# Patient Record
Sex: FEMALE | Race: BLACK OR AFRICAN AMERICAN | NOT HISPANIC OR LATINO | URBAN - METROPOLITAN AREA
[De-identification: names, ages, dates, MRNs, and addresses within clinical notes are randomized per-mention and may not be internally consistent; named-entity substitution may affect disease eponyms.]

---

## 2021-12-30 ENCOUNTER — EMERGENCY (EMERGENCY)
Facility: HOSPITAL | Age: 81
LOS: 1 days | Discharge: HOME | End: 2021-12-30
Attending: STUDENT IN AN ORGANIZED HEALTH CARE EDUCATION/TRAINING PROGRAM | Admitting: STUDENT IN AN ORGANIZED HEALTH CARE EDUCATION/TRAINING PROGRAM
Payer: MEDICARE

## 2021-12-30 VITALS
DIASTOLIC BLOOD PRESSURE: 72 MMHG | RESPIRATION RATE: 100 BRPM | HEART RATE: 84 BPM | TEMPERATURE: 99 F | OXYGEN SATURATION: 100 % | SYSTOLIC BLOOD PRESSURE: 149 MMHG

## 2021-12-30 VITALS
RESPIRATION RATE: 16 BRPM | DIASTOLIC BLOOD PRESSURE: 78 MMHG | SYSTOLIC BLOOD PRESSURE: 140 MMHG | TEMPERATURE: 98 F | HEART RATE: 80 BPM | OXYGEN SATURATION: 99 %

## 2021-12-30 LAB
ALBUMIN SERPL ELPH-MCNC: 4.4 G/DL — SIGNIFICANT CHANGE UP (ref 3.3–5)
ALP SERPL-CCNC: 94 U/L — SIGNIFICANT CHANGE UP (ref 40–120)
ALT FLD-CCNC: 12 U/L — SIGNIFICANT CHANGE UP (ref 4–33)
ANION GAP SERPL CALC-SCNC: 12 MMOL/L — SIGNIFICANT CHANGE UP (ref 7–14)
APPEARANCE UR: ABNORMAL
AST SERPL-CCNC: 22 U/L — SIGNIFICANT CHANGE UP (ref 4–32)
BACTERIA # UR AUTO: ABNORMAL
BASOPHILS # BLD AUTO: 0.07 K/UL — SIGNIFICANT CHANGE UP (ref 0–0.2)
BASOPHILS NFR BLD AUTO: 1.2 % — SIGNIFICANT CHANGE UP (ref 0–2)
BILIRUB SERPL-MCNC: 0.5 MG/DL — SIGNIFICANT CHANGE UP (ref 0.2–1.2)
BILIRUB UR-MCNC: NEGATIVE — SIGNIFICANT CHANGE UP
BUN SERPL-MCNC: 12 MG/DL — SIGNIFICANT CHANGE UP (ref 7–23)
CALCIUM SERPL-MCNC: 9.3 MG/DL — SIGNIFICANT CHANGE UP (ref 8.4–10.5)
CHLORIDE SERPL-SCNC: 105 MMOL/L — SIGNIFICANT CHANGE UP (ref 98–107)
CO2 SERPL-SCNC: 26 MMOL/L — SIGNIFICANT CHANGE UP (ref 22–31)
COLOR SPEC: YELLOW — SIGNIFICANT CHANGE UP
CREAT SERPL-MCNC: 0.71 MG/DL — SIGNIFICANT CHANGE UP (ref 0.5–1.3)
DIFF PNL FLD: NEGATIVE — SIGNIFICANT CHANGE UP
EOSINOPHIL # BLD AUTO: 0.11 K/UL — SIGNIFICANT CHANGE UP (ref 0–0.5)
EOSINOPHIL NFR BLD AUTO: 1.9 % — SIGNIFICANT CHANGE UP (ref 0–6)
EPI CELLS # UR: SIGNIFICANT CHANGE UP /HPF (ref 0–5)
GLUCOSE SERPL-MCNC: 98 MG/DL — SIGNIFICANT CHANGE UP (ref 70–99)
GLUCOSE UR QL: NEGATIVE — SIGNIFICANT CHANGE UP
HCT VFR BLD CALC: 49.4 % — HIGH (ref 34.5–45)
HGB BLD-MCNC: 16.9 G/DL — HIGH (ref 11.5–15.5)
IANC: 2.47 K/UL — SIGNIFICANT CHANGE UP (ref 1.5–8.5)
IMM GRANULOCYTES NFR BLD AUTO: 0.2 % — SIGNIFICANT CHANGE UP (ref 0–1.5)
KETONES UR-MCNC: NEGATIVE — SIGNIFICANT CHANGE UP
LEUKOCYTE ESTERASE UR-ACNC: NEGATIVE — SIGNIFICANT CHANGE UP
LYMPHOCYTES # BLD AUTO: 2.62 K/UL — SIGNIFICANT CHANGE UP (ref 1–3.3)
LYMPHOCYTES # BLD AUTO: 44.1 % — HIGH (ref 13–44)
MCHC RBC-ENTMCNC: 31 PG — SIGNIFICANT CHANGE UP (ref 27–34)
MCHC RBC-ENTMCNC: 34.2 GM/DL — SIGNIFICANT CHANGE UP (ref 32–36)
MCV RBC AUTO: 90.6 FL — SIGNIFICANT CHANGE UP (ref 80–100)
MONOCYTES # BLD AUTO: 0.66 K/UL — SIGNIFICANT CHANGE UP (ref 0–0.9)
MONOCYTES NFR BLD AUTO: 11.1 % — SIGNIFICANT CHANGE UP (ref 2–14)
NEUTROPHILS # BLD AUTO: 2.47 K/UL — SIGNIFICANT CHANGE UP (ref 1.8–7.4)
NEUTROPHILS NFR BLD AUTO: 41.5 % — LOW (ref 43–77)
NITRITE UR-MCNC: NEGATIVE — SIGNIFICANT CHANGE UP
NRBC # BLD: 0 /100 WBCS — SIGNIFICANT CHANGE UP
NRBC # FLD: 0 K/UL — SIGNIFICANT CHANGE UP
PH UR: 7.5 — SIGNIFICANT CHANGE UP (ref 5–8)
PLATELET # BLD AUTO: 272 K/UL — SIGNIFICANT CHANGE UP (ref 150–400)
POTASSIUM SERPL-MCNC: 4.1 MMOL/L — SIGNIFICANT CHANGE UP (ref 3.5–5.3)
POTASSIUM SERPL-SCNC: 4.1 MMOL/L — SIGNIFICANT CHANGE UP (ref 3.5–5.3)
PROT SERPL-MCNC: 7.9 G/DL — SIGNIFICANT CHANGE UP (ref 6–8.3)
PROT UR-MCNC: ABNORMAL
RBC # BLD: 5.45 M/UL — HIGH (ref 3.8–5.2)
RBC # FLD: 14.2 % — SIGNIFICANT CHANGE UP (ref 10.3–14.5)
RBC CASTS # UR COMP ASSIST: SIGNIFICANT CHANGE UP /HPF (ref 0–4)
SARS-COV-2 RNA SPEC QL NAA+PROBE: SIGNIFICANT CHANGE UP
SODIUM SERPL-SCNC: 143 MMOL/L — SIGNIFICANT CHANGE UP (ref 135–145)
SP GR SPEC: 1.02 — SIGNIFICANT CHANGE UP (ref 1–1.05)
UROBILINOGEN FLD QL: SIGNIFICANT CHANGE UP
WBC # BLD: 5.94 K/UL — SIGNIFICANT CHANGE UP (ref 3.8–10.5)
WBC # FLD AUTO: 5.94 K/UL — SIGNIFICANT CHANGE UP (ref 3.8–10.5)
WBC UR QL: SIGNIFICANT CHANGE UP /HPF (ref 0–5)

## 2021-12-30 PROCEDURE — 71045 X-RAY EXAM CHEST 1 VIEW: CPT | Mod: 26

## 2021-12-30 PROCEDURE — 99284 EMERGENCY DEPT VISIT MOD MDM: CPT

## 2021-12-30 RX ORDER — ACETAMINOPHEN 500 MG
650 TABLET ORAL ONCE
Refills: 0 | Status: COMPLETED | OUTPATIENT
Start: 2021-12-30 | End: 2021-12-30

## 2021-12-30 RX ADMIN — Medication 650 MILLIGRAM(S): at 23:18

## 2021-12-30 NOTE — ED ADULT TRIAGE NOTE - CHIEF COMPLAINT QUOTE
Pt reporting to the ED for R ear pain for ~2 weeks. Pt reports fever for 2 days, no cough, sob or chest pain. Pt ambulatory with walker. rspo2 100 % on room air.

## 2021-12-30 NOTE — ED PROVIDER NOTE - PATIENT PORTAL LINK FT
You can access the FollowMyHealth Patient Portal offered by [facility] by registering at the following website: [email or web address]. By joining [employer]’s FollowMyHealth portal, you will also be able to view your health information using other applications (apps) compatible with our system.

## 2021-12-30 NOTE — ED PROVIDER NOTE - PROGRESS NOTE DETAILS
Stefan PGY-2: Attempted to remove impacted cerumen with partial success, see procedure note. Will check basics/CXR/UA in setting of recent fevers at home. Do not anticipate need for further work up if basics/UA/CXR negative, very likely viral syndrome, can be discharged with PMD f/u prior to RVP resulting with return precautions.

## 2021-12-30 NOTE — ED PROVIDER NOTE - ATTENDING CONTRIBUTION TO CARE
I (Robert) agree with above, I performed a history and physical. Counseled siddharth medical staff, physician assistant, and/or medical student on medical decision making as documented. Medical decisions and treatment interventions were made in real time during the patient encounter. Additionally and/or with the following exceptions: The patient presented to the ED with right ear discomfort, has been going on for 2-3 weeks, went to her pmd who started debrox drops. Of note reported to have a fever at home over past several days, ++vaccinated, no other infectious symptoms. will attempt cerumen removal, no evidence of otitis externa, no hearing loss with tuning fork. Given history of fever will obtain cxr, ua, basic labs to rule out infectious etiology, as well as flu/covid to rule out viral etiology. Signed out to oncoming attending pending this workup.

## 2021-12-30 NOTE — ED PROVIDER NOTE - OBJECTIVE STATEMENT
81 year old female with history of CAD s/p CABG, HTN presenting with R ear pain x 1 mo. States having throbbing pain to R ear in past ~month associated with decreased hearing, saw PMD and told to use peroxide drops but not improved. Also reports having fever in past 2d, denies chest pain, cough, abdominal pain, N/V/D, sick contacts. +COVID vaccinated. Denies etoh/tobacco/drug use. 81 year old female with history of CAD s/p CABG, HTN presenting with R ear pain x 1 mo. States having throbbing pain to R ear in past ~month associated with decreased hearing, saw PMD and told to use peroxide drops but not improved. Also reports having fever in past 2d, denies chest pain, cough, abdominal pain, N/V/D, sick contacts. +COVID vaccinated. Denies etoh/tobacco/drug use. ENT appointment made for february.

## 2021-12-30 NOTE — ED PROVIDER NOTE - NSFOLLOWUPINSTRUCTIONS_ED_ALL_ED_FT
You were seen in the Emergency Department for: ear wax collection in right ear    For pain/fever, you may take Tylenol (acetaminophen) 650 mg every 6 hours, OR Advil (ibuprofen) 600 mg every 8 hours.    Please follow up with your primary physician as discussed. If you do not have a primary physician or specialist of your needs, please call [phone] to find one convenient for you. At this number you will be able to locate a provider who accepts your insurance, as well as locate the right specialist for your needs.    You should return to the Emergency Department if you feel any new/worsening/persistent symptoms including but not limited to: chest pain, difficulty breathing, loss of consciousness, bleeding, uncontrolled pain, numbness/weakness of a body part

## 2021-12-30 NOTE — ED PROVIDER NOTE - PHYSICAL EXAMINATION
Gen - NAD; well-appearing; A+Ox3   HEENT - NCAT, EOMI, moist mucous membranes, no erythema/swelling/drainage to b/l external ear/auditory canal, no mastoid tenderness, +cerumen impaction R ear, unable to visualize TM  Neck - supple  Resp - CTAB, no increased WOB  CV -  RRR, no m/r/g  Abd - soft, NT, ND; no guarding or rebound  MSK - 5/5 strength and FROM b/l UE and LE  Extrem - no LE edema/erythema/tenderness  Neuro - no focal motor or sensation deficits

## 2021-12-30 NOTE — ED ADULT NURSE NOTE - OBJECTIVE STATEMENT
81 yof A&Ox4, c/o R ear pain x1 month that worsened today which is why she came into ED. No redness, drainage or swelling present. Respirations even and unlabored, no accessory muscle use. Pt denies any chest pain, dyspnea, dizziness, blurry vision, nausea, vomiting or discomfort., Pt well appearing, no acute distress noted. Pt ambulatory with cane. Labs drawn and sent. bed in lowest position, side rails up, call bell in hand, safety maintained. awaiting further orders. will continue to monitor.

## 2021-12-30 NOTE — ED PROVIDER NOTE - CLINICAL SUMMARY MEDICAL DECISION MAKING FREE TEXT BOX
81 year old female with history of CAD s/p CABG, HTN presenting with R ear pain x 1 mo. 81 year old female with history of CAD s/p CABG, HTN presenting with R ear pain x 1 mo. Appears well here with normal vitals, on exam has cerumen impaction to R ear without evidence for otitis externa/mastoiditis, unable to visualize R TM. Will attempt cerumen impaction but also obtain basic labs/UA/CXR/RVP to investigate etiology of subjective fever at home, likely viral

## 2021-12-31 LAB

## 2022-01-02 LAB
CULTURE RESULTS: SIGNIFICANT CHANGE UP
SPECIMEN SOURCE: SIGNIFICANT CHANGE UP

## 2022-01-06 PROBLEM — I25.10 ATHEROSCLEROTIC HEART DISEASE OF NATIVE CORONARY ARTERY WITHOUT ANGINA PECTORIS: Chronic | Status: ACTIVE | Noted: 2021-12-30

## 2022-01-06 PROBLEM — I10 ESSENTIAL (PRIMARY) HYPERTENSION: Chronic | Status: ACTIVE | Noted: 2021-12-30

## 2022-01-07 ENCOUNTER — APPOINTMENT (OUTPATIENT)
Dept: RHEUMATOLOGY | Facility: CLINIC | Age: 82
End: 2022-01-07
Payer: MEDICARE

## 2022-01-07 DIAGNOSIS — Z78.9 OTHER SPECIFIED HEALTH STATUS: ICD-10-CM

## 2022-01-07 DIAGNOSIS — M25.60 STIFFNESS OF UNSPECIFIED JOINT, NOT ELSEWHERE CLASSIFIED: ICD-10-CM

## 2022-01-07 DIAGNOSIS — Z87.891 PERSONAL HISTORY OF NICOTINE DEPENDENCE: ICD-10-CM

## 2022-01-07 DIAGNOSIS — Z82.61 FAMILY HISTORY OF ARTHRITIS: ICD-10-CM

## 2022-01-07 DIAGNOSIS — R52 PAIN, UNSPECIFIED: ICD-10-CM

## 2022-01-07 DIAGNOSIS — Z86.79 PERSONAL HISTORY OF OTHER DISEASES OF THE CIRCULATORY SYSTEM: ICD-10-CM

## 2022-01-07 DIAGNOSIS — M89.49 OTHER HYPERTROPHIC OSTEOARTHROPATHY, MULTIPLE SITES: ICD-10-CM

## 2022-01-07 DIAGNOSIS — Z86.39 PERSONAL HISTORY OF OTHER ENDOCRINE, NUTRITIONAL AND METABOLIC DISEASE: ICD-10-CM

## 2022-01-07 DIAGNOSIS — M13.0 POLYARTHRITIS, UNSPECIFIED: ICD-10-CM

## 2022-01-07 PROBLEM — Z00.00 ENCOUNTER FOR PREVENTIVE HEALTH EXAMINATION: Status: ACTIVE | Noted: 2022-01-07

## 2022-01-07 PROCEDURE — 99205 OFFICE O/P NEW HI 60 MIN: CPT | Mod: 95

## 2022-01-07 RX ORDER — NIFEDIPINE 60 MG/1
60 TABLET, EXTENDED RELEASE ORAL DAILY
Refills: 0 | Status: ACTIVE | COMMUNITY
Start: 2022-01-07

## 2022-01-07 RX ORDER — ASPIRIN ENTERIC COATED TABLETS 81 MG 81 MG/1
81 TABLET, DELAYED RELEASE ORAL
Refills: 0 | Status: ACTIVE | COMMUNITY
Start: 2022-01-07

## 2022-01-07 RX ORDER — OXYCODONE AND ACETAMINOPHEN 5; 325 MG/1; MG/1
5-325 TABLET ORAL EVERY 8 HOURS
Qty: 90 | Refills: 0 | Status: ACTIVE | COMMUNITY
Start: 2022-01-07 | End: 1900-01-01

## 2022-01-07 RX ORDER — ATORVASTATIN CALCIUM 40 MG/1
40 TABLET, FILM COATED ORAL DAILY
Qty: 90 | Refills: 1 | Status: ACTIVE | COMMUNITY
Start: 2022-01-07

## 2022-01-07 RX ORDER — GLUCOSAMINE/D3/BOSWELLIA SERRA 1500MG-400
TABLET ORAL
Refills: 0 | Status: ACTIVE | COMMUNITY
Start: 2022-01-07

## 2022-01-07 RX ORDER — GABAPENTIN 100 MG/1
100 CAPSULE ORAL
Refills: 0 | Status: ACTIVE | COMMUNITY
Start: 2022-01-07

## 2022-01-07 NOTE — PHYSICAL EXAM
[General Appearance - Alert] : alert [General Appearance - In No Acute Distress] : in no acute distress [Sclera] : the sclera and conjunctiva were normal [Outer Ear] : the ears and nose were normal in appearance [Oropharynx] : the oropharynx was normal [Neck Appearance] : the appearance of the neck was normal [Skin Color & Pigmentation] : normal skin color and pigmentation [Skin Turgor] : normal skin turgor [] : no rash [Oriented To Time, Place, And Person] : oriented to person, place, and time [Impaired Insight] : insight and judgment were intact [Affect] : the affect was normal [FreeTextEntry1] : No visible joint swelling;  B/L shoulder w/ pain upon abduction and internal rotation;  B/L hips w/ paonful decreased forward flexion

## 2022-01-07 NOTE — HISTORY OF PRESENT ILLNESS
[Home] : at home, [unfilled] , at the time of the visit. [Medical Office: ([facility])___] : at the medical office located in  [Verbal consent obtained from patient] : the patient, [unfilled] [Dry Mouth] : dry mouth [Arthralgias] : arthralgias [FreeTextEntry1] : 81 year old female with PMHx as listed below reports that she has been experiencing joint pains, including her hands (angela thumbs), hips, right shoulder, and right knee, for the past 10+ years.  The pain is constant, though worse with activity and better at rest.  She describes the pain as burning, ranging from 6-10 out of 10.  (+) occasional swelling in her left hip (which is s/p THR).  (+)AM stiffness, usually lasting about 1 hour.  She gets some relief from Percocet and Advil.  No other known alleviating factors.\par Pt also c/o  trigger hand of right 5th finger.\par \par No F/C, no unintentional weight loss, no night sweats, no oral ulcers, no rashes, no alopecia, no photosensitivity, no dry eyes, (+)dry mouth - primarily at nights, no Raynaud symptoms, no focal weakness, no dysphagia \par \par  [Anorexia] : no anorexia [Weight Loss] : no weight loss [Malaise] : no malaise [Fever] : no fever [Chills] : no chills [Fatigue] : no fatigue [Malar Facial Rash] : no malar facial rash [Skin Lesions] : no lesions [Skin Nodules] : no skin nodules [Oral Ulcers] : no oral ulcers [Cough] : no cough [Dysphonia] : no dysphonia [Dysphagia] : no dysphagia [Shortness of Breath] : no shortness of breath [Chest Pain] : no chest pain [Joint Swelling] : no joint swelling [Joint Warmth] : no joint warmth [Joint Deformity] : no joint deformity [Decreased ROM] : no decreased range of motion [Morning Stiffness] : no morning stiffness [Falls] : no falls [Difficulty Standing] : no difficulty standing [Difficulty Walking] : no difficulty walking [Dyspnea] : no dyspnea [Myalgias] : no myalgias [Muscle Weakness] : no muscle weakness [Muscle Spasms] : no muscle spasms [Muscle Cramping] : no muscle cramping [Visual Changes] : no visual changes [Eye Pain] : no eye pain [Eye Redness] : no eye redness [Dry Eyes] : no dry eyes

## 2022-01-07 NOTE — ASSESSMENT
[FreeTextEntry1] : 81 year old female presents with polyarticular arthritis.  Her presentation is most suggestive of osteoarthritis.  However, she does c/o prolonged morning stiffness, which can be suggestive of a possible concurrent inflammatory arthritis.  I have therefore ordered some bloodwork and x-rays to confirm OA and rule out other concurrent etiologies.  She will follow up with me again in 2-3 weeks to review her results.  In the meantime, I have recommended conservative treatment, including exercises, ibuprofen and/or Tylenol prn, warm compresses, and weight loss.  She also reports that she has been taking Percocet 5/mg/325mg for the past 10 years, but just moved from [place] to [place] and is about to run out (confirmed via  registry) .  I told her that I can provide a prescription for this month to prevent withdrawal but she needs to see a pain management specialist for future refills.

## 2022-04-12 ENCOUNTER — APPOINTMENT (OUTPATIENT)
Dept: ORTHOPEDIC SURGERY | Facility: CLINIC | Age: 82
End: 2022-04-12

## 2022-04-20 ENCOUNTER — APPOINTMENT (OUTPATIENT)
Dept: PAIN MANAGEMENT | Facility: CLINIC | Age: 82
End: 2022-04-20
Payer: MEDICARE

## 2022-04-20 PROCEDURE — 62323 NJX INTERLAMINAR LMBR/SAC: CPT

## 2022-04-20 NOTE — PROCEDURE
[FreeTextEntry3] : Date of Service: 04/20/2022 \par \par Account: [id number]\par \par Patient: ASHLI ORTEGA \par \par YOB: 1940\par \par Age: 81 year\par \par \par Surgeon:                                                         Josep Bowling D.O.\par \par Pre-Operative Diagnosis:                             Lumbosacral radiculitis\par \par Post-Operative Diagnosis:                           Same\par \par Procedure:                                                      Interlaminar lumbar epidural steroid injection (L5-S1) under fluoroscopic guidance\par \par Anesthesia:                                                     Local with MAC\par \par \par This procedure was carried out using fluoroscopic guidance.  The risks and benefits of the procedure were discussed extensively with the patient.  The consent of the patient was obtained and the following procedure was performed.\par \par The patient was placed in the prone position.  The lumbar area was prepped and draped in a sterile fashion.  A timeout was performed with all essential staff present and the site and side were verified. Under AP view with slight cephalad-caudad angulation, the L5-S1 interspace was identified and marked. Unable to enter epidural space at L4-5 due to anatomy.  Using sterile technique, the superficial skin was anesthetized with 1% Lidocaine without epinephrine.  A 20-gauge Tuohy needle was advanced into the epidural space under fluoroscopy using [id number] technique and using loss of resistance at the L5-S1 level.  After negative aspiration for heme or CSF, an epidurogram was obtained using 2-3 cc Omnipaque contrast injected under live fluoroscopy, confirming epidural placement of the needle.  \par \par Epidurogram showed no evidence of intrathecal or intravascular flow, and good evidence of bilateral epidural flow from L3-S2 levels.  After this, 4 cc of preservative free normal saline plus 12 mg of betamethasone were injected into the epidural space.\par \par The needle was subsequently removed.  Anesthesia personnel were present throughout the procedure.\par \par The patient tolerated the procedure well and was instructed to contact me immediately if there were any problems.\par \par Josep Bowling D.O.\par

## 2022-05-06 ENCOUNTER — APPOINTMENT (OUTPATIENT)
Dept: PAIN MANAGEMENT | Facility: CLINIC | Age: 82
End: 2022-05-06
Payer: MEDICARE

## 2022-05-06 VITALS — HEIGHT: 64 IN | BODY MASS INDEX: 36.37 KG/M2 | WEIGHT: 213 LBS

## 2022-05-06 DIAGNOSIS — M54.17 RADICULOPATHY, LUMBOSACRAL REGION: ICD-10-CM

## 2022-05-06 DIAGNOSIS — M54.50 LOW BACK PAIN, UNSPECIFIED: ICD-10-CM

## 2022-05-06 PROCEDURE — 99213 OFFICE O/P EST LOW 20 MIN: CPT

## 2022-05-06 NOTE — DISCUSSION/SUMMARY
[de-identified] : After discussing various treatment options with the patient including but not limited to oral medications, physical therapy, exercise modalities as well as interventional spinal injections, we have decided with the following plan:\par \par - Continue home exercises, stretching, activity modification, physical therapy, and conservative care.\par - Follow-up as needed.\par

## 2022-05-06 NOTE — HISTORY OF PRESENT ILLNESS
[Lower back] : lower back [8] : 8 [Burning] : burning [Dull/Aching] : dull/aching [Radiating] : radiating [Tingling] : tingling [] : yes [Household chores] : household chores [Sleep] : sleep [Injection therapy] : injection therapy [Walking] : walking [Lying in bed] : lying in bed [FreeTextEntry1] : center  [FreeTextEntry6] : heavyness [FreeTextEntry7] : b/l thighs, knees, and calf,  b/l arms to elbow, fingers

## 2022-05-06 NOTE — PHYSICAL EXAM
[de-identified] : Constitutional; Appears well, no apparent distress\par Ability to communicate: Normal \par Respiratory: non-labored breathing\par Skin: No rash noted\par Head: Normocephalic, atraumatic\par Neck: no visible thyroid enlargement\par Eyes: Extraocular movements intact\par Neurologic: Alert and oriented x3\par Psychiatric: normal mood, affect and behavior \par \par  [] : no lumbar paraspinal spasm

## 2022-05-25 ENCOUNTER — APPOINTMENT (OUTPATIENT)
Dept: RHEUMATOLOGY | Facility: CLINIC | Age: 82
End: 2022-05-25

## 2022-05-27 NOTE — ED PROVIDER NOTE - HIV OFFER
Infectious disease is consulted. IR consulted to evaluate possible paraspinous muscle myositis versus abscess.    --Approximately 1 ml of purulent drainage aspirated 5/11 per IR, growing MSSA  --Continue current regimen  --Oxacillin x 4 weeks total   Opt out

## 2022-05-30 ENCOUNTER — RX RENEWAL (OUTPATIENT)
Age: 82
End: 2022-05-30

## 2022-05-30 RX ORDER — MELOXICAM 7.5 MG/1
7.5 TABLET ORAL
Qty: 60 | Refills: 1 | Status: ACTIVE | COMMUNITY
Start: 2022-05-30 | End: 1900-01-01

## 2024-11-18 ENCOUNTER — NON-APPOINTMENT (OUTPATIENT)
Age: 84
End: 2024-11-18

## 2024-11-21 ENCOUNTER — NON-APPOINTMENT (OUTPATIENT)
Age: 84
End: 2024-11-21

## 2024-11-22 ENCOUNTER — APPOINTMENT (OUTPATIENT)
Dept: GYNECOLOGIC ONCOLOGY | Facility: CLINIC | Age: 84
End: 2024-11-22
Payer: MEDICARE

## 2024-11-22 ENCOUNTER — NON-APPOINTMENT (OUTPATIENT)
Age: 84
End: 2024-11-22

## 2024-11-22 VITALS
DIASTOLIC BLOOD PRESSURE: 80 MMHG | BODY MASS INDEX: 38.41 KG/M2 | HEIGHT: 64 IN | OXYGEN SATURATION: 94 % | TEMPERATURE: 98 F | HEART RATE: 100 BPM | WEIGHT: 225 LBS | SYSTOLIC BLOOD PRESSURE: 150 MMHG

## 2024-11-22 DIAGNOSIS — R19.09 OTHER INTRA-ABDOMINAL AND PELVIC SWELLING, MASS AND LUMP: ICD-10-CM

## 2024-11-22 DIAGNOSIS — R19.00 INTRA-ABDOMINAL AND PELVIC SWELLING, MASS AND LUMP, UNSPECIFIED SITE: ICD-10-CM

## 2024-11-22 DIAGNOSIS — R10.9 UNSPECIFIED ABDOMINAL PAIN: ICD-10-CM

## 2024-11-22 PROCEDURE — 99205 OFFICE O/P NEW HI 60 MIN: CPT

## 2024-11-22 PROCEDURE — 99459 PELVIC EXAMINATION: CPT

## 2024-11-22 RX ORDER — CYCLOBENZAPRINE HYDROCHLORIDE 5 MG/1
5 TABLET, FILM COATED ORAL
Refills: 0 | Status: ACTIVE | COMMUNITY

## 2024-11-22 RX ORDER — OXYCODONE 5 MG/1
5 TABLET ORAL EVERY 6 HOURS
Qty: 20 | Refills: 0 | Status: ACTIVE | COMMUNITY
Start: 2024-11-22 | End: 1900-01-01

## 2024-11-22 RX ORDER — OMEPRAZOLE MAGNESIUM 20 MG/1
TABLET, DELAYED RELEASE ORAL
Refills: 0 | Status: ACTIVE | COMMUNITY

## 2024-11-26 LAB
CANCER AG125 SERPL-ACNC: 9 U/ML
CANCER AG19-9 SERPL-ACNC: 13 U/ML
CEA SERPL-MCNC: 2.1 NG/ML

## 2024-11-27 ENCOUNTER — NON-APPOINTMENT (OUTPATIENT)
Age: 84
End: 2024-11-27

## 2024-11-27 LAB — INHIBIN B: <7 PG/ML

## 2024-11-29 ENCOUNTER — INPATIENT (INPATIENT)
Facility: HOSPITAL | Age: 84
LOS: 3 days | Discharge: HOME | DRG: 316 | End: 2024-12-03
Attending: INTERNAL MEDICINE | Admitting: INTERNAL MEDICINE
Payer: MEDICARE

## 2024-11-29 VITALS
TEMPERATURE: 98 F | DIASTOLIC BLOOD PRESSURE: 82 MMHG | RESPIRATION RATE: 18 BRPM | WEIGHT: 220.02 LBS | HEIGHT: 64 IN | OXYGEN SATURATION: 95 % | HEART RATE: 102 BPM | SYSTOLIC BLOOD PRESSURE: 127 MMHG

## 2024-11-29 DIAGNOSIS — E78.5 HYPERLIPIDEMIA, UNSPECIFIED: ICD-10-CM

## 2024-11-29 DIAGNOSIS — Z79.899 OTHER LONG TERM (CURRENT) DRUG THERAPY: ICD-10-CM

## 2024-11-29 DIAGNOSIS — R94.31 ABNORMAL ELECTROCARDIOGRAM [ECG] [EKG]: ICD-10-CM

## 2024-11-29 DIAGNOSIS — M19.90 UNSPECIFIED OSTEOARTHRITIS, UNSPECIFIED SITE: ICD-10-CM

## 2024-11-29 DIAGNOSIS — I10 ESSENTIAL (PRIMARY) HYPERTENSION: ICD-10-CM

## 2024-11-29 DIAGNOSIS — N94.89 OTHER SPECIFIED CONDITIONS ASSOCIATED WITH FEMALE GENITAL ORGANS AND MENSTRUAL CYCLE: ICD-10-CM

## 2024-11-29 DIAGNOSIS — E83.52 HYPERCALCEMIA: ICD-10-CM

## 2024-11-29 LAB
ALBUMIN SERPL ELPH-MCNC: 2.4 G/DL — LOW (ref 3.3–5)
ALP SERPL-CCNC: 102 U/L — SIGNIFICANT CHANGE UP (ref 40–120)
ALT FLD-CCNC: 8 U/L — LOW (ref 10–45)
ANION GAP SERPL CALC-SCNC: 18 MMOL/L — HIGH (ref 5–17)
APTT BLD: 31.2 SEC — SIGNIFICANT CHANGE UP (ref 24.5–35.6)
AST SERPL-CCNC: 29 U/L — SIGNIFICANT CHANGE UP (ref 10–40)
BASOPHILS # BLD AUTO: 0.08 K/UL — SIGNIFICANT CHANGE UP (ref 0–0.2)
BASOPHILS NFR BLD AUTO: 1 % — SIGNIFICANT CHANGE UP (ref 0–2)
BILIRUB SERPL-MCNC: 0.8 MG/DL — SIGNIFICANT CHANGE UP (ref 0.2–1.2)
BUN SERPL-MCNC: 15 MG/DL — SIGNIFICANT CHANGE UP (ref 7–23)
CALCIUM SERPL-MCNC: 11.1 MG/DL — HIGH (ref 8.4–10.5)
CHLORIDE SERPL-SCNC: 101 MMOL/L — SIGNIFICANT CHANGE UP (ref 96–108)
CO2 SERPL-SCNC: 17 MMOL/L — LOW (ref 22–31)
CREAT SERPL-MCNC: 0.85 MG/DL — SIGNIFICANT CHANGE UP (ref 0.5–1.3)
D DIMER BLD IA.RAPID-MCNC: 403 NG/ML DDU — HIGH
EGFR: 68 ML/MIN/1.73M2 — SIGNIFICANT CHANGE UP
EOSINOPHIL # BLD AUTO: 0.07 K/UL — SIGNIFICANT CHANGE UP (ref 0–0.5)
EOSINOPHIL NFR BLD AUTO: 0.9 % — SIGNIFICANT CHANGE UP (ref 0–6)
GLUCOSE SERPL-MCNC: 90 MG/DL — SIGNIFICANT CHANGE UP (ref 70–99)
HCT VFR BLD CALC: 40 % — SIGNIFICANT CHANGE UP (ref 34.5–45)
HGB BLD-MCNC: 13.2 G/DL — SIGNIFICANT CHANGE UP (ref 11.5–15.5)
IMM GRANULOCYTES NFR BLD AUTO: 0.2 % — SIGNIFICANT CHANGE UP (ref 0–0.9)
INR BLD: 1.06 RATIO — SIGNIFICANT CHANGE UP (ref 0.85–1.16)
LYMPHOCYTES # BLD AUTO: 2.23 K/UL — SIGNIFICANT CHANGE UP (ref 1–3.3)
LYMPHOCYTES # BLD AUTO: 27.2 % — SIGNIFICANT CHANGE UP (ref 13–44)
MAGNESIUM SERPL-MCNC: 2 MG/DL — SIGNIFICANT CHANGE UP (ref 1.6–2.6)
MCHC RBC-ENTMCNC: 30.1 PG — SIGNIFICANT CHANGE UP (ref 27–34)
MCHC RBC-ENTMCNC: 33 G/DL — SIGNIFICANT CHANGE UP (ref 32–36)
MCV RBC AUTO: 91.3 FL — SIGNIFICANT CHANGE UP (ref 80–100)
MONOCYTES # BLD AUTO: 0.92 K/UL — HIGH (ref 0–0.9)
MONOCYTES NFR BLD AUTO: 11.2 % — SIGNIFICANT CHANGE UP (ref 2–14)
NEUTROPHILS # BLD AUTO: 4.88 K/UL — SIGNIFICANT CHANGE UP (ref 1.8–7.4)
NEUTROPHILS NFR BLD AUTO: 59.5 % — SIGNIFICANT CHANGE UP (ref 43–77)
NRBC # BLD: 0 /100 WBCS — SIGNIFICANT CHANGE UP (ref 0–0)
PLATELET # BLD AUTO: 491 K/UL — HIGH (ref 150–400)
POTASSIUM SERPL-MCNC: 4.4 MMOL/L — SIGNIFICANT CHANGE UP (ref 3.5–5.3)
POTASSIUM SERPL-SCNC: 4.4 MMOL/L — SIGNIFICANT CHANGE UP (ref 3.5–5.3)
PROT SERPL-MCNC: 8.4 G/DL — HIGH (ref 6–8.3)
PROTHROM AB SERPL-ACNC: 12.2 SEC — SIGNIFICANT CHANGE UP (ref 9.9–13.4)
RBC # BLD: 4.38 M/UL — SIGNIFICANT CHANGE UP (ref 3.8–5.2)
RBC # FLD: 14.4 % — SIGNIFICANT CHANGE UP (ref 10.3–14.5)
SODIUM SERPL-SCNC: 136 MMOL/L — SIGNIFICANT CHANGE UP (ref 135–145)
TROPONIN T, HIGH SENSITIVITY RESULT: 7 NG/L — SIGNIFICANT CHANGE UP (ref 0–51)
WBC # BLD: 8.2 K/UL — SIGNIFICANT CHANGE UP (ref 3.8–10.5)
WBC # FLD AUTO: 8.2 K/UL — SIGNIFICANT CHANGE UP (ref 3.8–10.5)

## 2024-11-29 PROCEDURE — 71045 X-RAY EXAM CHEST 1 VIEW: CPT | Mod: 26

## 2024-11-29 PROCEDURE — 99285 EMERGENCY DEPT VISIT HI MDM: CPT | Mod: FS

## 2024-11-29 PROCEDURE — 93010 ELECTROCARDIOGRAM REPORT: CPT

## 2024-11-29 PROCEDURE — 99223 1ST HOSP IP/OBS HIGH 75: CPT

## 2024-11-29 RX ORDER — POLYETHYLENE GLYCOL 3350 17 G/17G
17 POWDER, FOR SOLUTION ORAL DAILY
Refills: 0 | Status: DISCONTINUED | OUTPATIENT
Start: 2024-11-29 | End: 2024-12-03

## 2024-11-29 RX ORDER — ACETAMINOPHEN, DIPHENHYDRAMINE HCL, PHENYLEPHRINE HCL 325; 25; 5 MG/1; MG/1; MG/1
3 TABLET ORAL AT BEDTIME
Refills: 0 | Status: DISCONTINUED | OUTPATIENT
Start: 2024-11-29 | End: 2024-12-03

## 2024-11-29 RX ORDER — ENOXAPARIN SODIUM 30 MG/.3ML
40 INJECTION SUBCUTANEOUS EVERY 24 HOURS
Refills: 0 | Status: DISCONTINUED | OUTPATIENT
Start: 2024-11-29 | End: 2024-12-03

## 2024-11-29 RX ORDER — ONDANSETRON HYDROCHLORIDE 4 MG/1
4 TABLET, FILM COATED ORAL EVERY 8 HOURS
Refills: 0 | Status: DISCONTINUED | OUTPATIENT
Start: 2024-11-29 | End: 2024-12-03

## 2024-11-29 RX ORDER — NALOXONE HCL 0.4 MG/ML
0.4 AMPUL (ML) INJECTION ONCE
Refills: 0 | Status: DISCONTINUED | OUTPATIENT
Start: 2024-11-29 | End: 2024-12-03

## 2024-11-29 RX ORDER — NIFEDIPINE 10 MG
60 CAPSULE ORAL DAILY
Refills: 0 | Status: DISCONTINUED | OUTPATIENT
Start: 2024-11-29 | End: 2024-12-03

## 2024-11-29 RX ORDER — OXYCODONE HYDROCHLORIDE 30 MG/1
5 TABLET ORAL ONCE
Refills: 0 | Status: DISCONTINUED | OUTPATIENT
Start: 2024-11-29 | End: 2024-11-29

## 2024-11-29 RX ORDER — MAGNESIUM, ALUMINUM HYDROXIDE 200-225/5
30 SUSPENSION, ORAL (FINAL DOSE FORM) ORAL EVERY 4 HOURS
Refills: 0 | Status: DISCONTINUED | OUTPATIENT
Start: 2024-11-29 | End: 2024-12-03

## 2024-11-29 RX ORDER — OXYCODONE HYDROCHLORIDE 30 MG/1
5 TABLET ORAL EVERY 6 HOURS
Refills: 0 | Status: DISCONTINUED | OUTPATIENT
Start: 2024-11-29 | End: 2024-12-03

## 2024-11-29 RX ORDER — SENNOSIDES 8.6 MG
2 TABLET ORAL AT BEDTIME
Refills: 0 | Status: DISCONTINUED | OUTPATIENT
Start: 2024-11-29 | End: 2024-12-03

## 2024-11-29 RX ORDER — SODIUM CHLORIDE 9 MG/ML
1000 INJECTION, SOLUTION INTRAMUSCULAR; INTRAVENOUS; SUBCUTANEOUS
Refills: 0 | Status: DISCONTINUED | OUTPATIENT
Start: 2024-11-29 | End: 2024-12-03

## 2024-11-29 RX ORDER — ACETAMINOPHEN 500MG 500 MG/1
650 TABLET, COATED ORAL EVERY 6 HOURS
Refills: 0 | Status: DISCONTINUED | OUTPATIENT
Start: 2024-11-29 | End: 2024-11-29

## 2024-11-29 RX ADMIN — Medication 2 TABLET(S): at 21:38

## 2024-11-29 RX ADMIN — OXYCODONE HYDROCHLORIDE 5 MILLIGRAM(S): 30 TABLET ORAL at 15:21

## 2024-11-29 RX ADMIN — OXYCODONE HYDROCHLORIDE 5 MILLIGRAM(S): 30 TABLET ORAL at 22:30

## 2024-11-29 RX ADMIN — OXYCODONE HYDROCHLORIDE 5 MILLIGRAM(S): 30 TABLET ORAL at 21:36

## 2024-11-29 RX ADMIN — ACETAMINOPHEN, DIPHENHYDRAMINE HCL, PHENYLEPHRINE HCL 3 MILLIGRAM(S): 325; 25; 5 TABLET ORAL at 21:35

## 2024-11-29 NOTE — H&P ADULT - NSICDXPASTMEDICALHX_GEN_ALL_CORE_FT
PAST MEDICAL HISTORY:  CAD (coronary artery disease)     HTN (hypertension)     HTN (hypertension)     Mass of uterine adnexa

## 2024-11-29 NOTE — H&P ADULT - PROBLEM SELECTOR PLAN 1
h/o cad, pericardial window h/o cad, pericardial window  no clinft of acs  ekg shows accelerated junctional rhythm, lad, lafb, lvh w repolarization changes, twi in leads I + avl; no prior ekg to compare to  trop 7  follow up tte; lipid profile, a1c; ctpa  monitor for chest pain, ekg changes  cards consult in am

## 2024-11-29 NOTE — ED PROVIDER NOTE - CLINICAL SUMMARY MEDICAL DECISION MAKING FREE TEXT BOX
Elderly female with likely gynecological malignancy p/w abnormal EKG and request for cardiac evaluation. Will obtain labwork and contact cardiology regarding echo and stress tests.

## 2024-11-29 NOTE — H&P ADULT - NSHPLABSRESULTS_GEN_ALL_CORE
ekg shows accelerated junctional rhythm, lad, lafb, lvh w repolarization changes, twi in leads I + avl; no prior ekg to compare to

## 2024-11-29 NOTE — ED PROVIDER NOTE - PHYSICAL EXAMINATION
PHYSICAL EXAM:  GENERAL: NAD, well-developed, obese  HEAD:  Atraumatic, Normocephalic  EYES: EOMI, PERRL  NECK: Supple, No JVD  CHEST/LUNG: Clear to auscultation bilaterally; No wheeze  HEART: Regular rate and rhythm; No murmurs, rubs, or gallops  ABDOMEN: Soft, tender over lower abdomen, Nondistended; Bowel sounds present  EXTREMITIES:  2+ Peripheral Pulses, No clubbing, cyanosis, or edema  PSYCH: AAOx3  NEUROLOGY: non-focal  SKIN: No rashes or lesions

## 2024-11-29 NOTE — H&P ADULT - PROBLEM SELECTOR PLAN 2
corrected ca ~12.4  a/w gamma gap (?dehydration)  a/w adnexal mass (?malignancy induced)  Monitor UO, BUN/Cr, volume status, acid-base balance, electrolytes   follow up u.ca, u.cr; ionized ca; pth; pthrp  trend ca q4-6h until wnl  encourage po intake; IVF resusci in the mean time; further therapy contingent on response

## 2024-11-29 NOTE — H&P ADULT - PROBLEM SELECTOR PLAN 6
h/o oa w chronic lbp/lumbar radiculopathy + s/p l thr  Uses walker in home but mostly wheelchair bound due to pain, arthritis, deconditioning for >1year; ECOG 3  maintain fall+frac precautions   cont home analgesics  pt/ot eval + sw/cm consult for disposition

## 2024-11-29 NOTE — H&P ADULT - PROBLEM SELECTOR PLAN 3
outpatient gyn-onc documentation reviewed  CT chest abdomen pelvis 11/5/2024; There is a multiseptated cystic adnexal mass with associated coarse calcification measuring 8 x 10.7 x 10.1 ...No lymphadenopathy ....There is a 3.5 x 3.8 cystic collection with peripheral calcification noted in the right inguinal canal  tentatively planned for, "Robotic assisted excision of pelvic mass, TLH BSO, possible LN dissection, omentectomy staging," on 12/5  cont home pain percocet/oxycodone  outpatient follow up on discharge

## 2024-11-29 NOTE — H&P ADULT - ASSESSMENT
85yo f, former smoker, w pmh htn, hld, cad, oa w chronic lbp/lumbar radiculopathy + s/p l thr, recently found r ovarian cystic mass for which she is tentatively scheduled for, "Robotic assisted excision of pelvic mass, TLH BSO, possible LN dissection, omentectomy staging," on 12/5, p/w abnormal presurgical testing, w abnormal ekg; in er, ekg showing accelerated junctional rhythm + lad, lafb + lvh w repolarization changes, twi in leads I + avL, no prior ekgs to compare to; labs show hyperca; admit to medicine for further mgmt

## 2024-11-29 NOTE — H&P ADULT - HISTORY OF PRESENT ILLNESS
83yo f, former smoker, w pmh htn, hld, cad, oa w chronic lbp/lumbar radiculopathy + s/p l thr, recently found r ovarian cystic mass for which she is tentatively scheduled for, "Robotic assisted excision of pelvic mass, TLH BSO, possible LN dissection, omentectomy staging," on 12/5, p/w abnormal presurgical testing, w abnormal ekg; in er, ekg showing accelerated junctional rhythm + lad, lafb + lvh w repolarization changes, twi in leads I + avL, no prior ekgs to compare to; labs show hyperca; admit to medicine for further mgmt     83yo f, former smoker, w pmh htn, hld, cad, oa w chronic lbp/lumbar radiculopathy + s/p l thr, recently found r ovarian cystic mass for which she is tentatively scheduled for, "Robotic assisted excision of pelvic mass, TLH BSO, possible LN dissection, omentectomy staging," on 12/5, p/w abnormal presurgical testing, w abnormal ekg; in er, ekg showing accelerated junctional rhythm + lad, lafb + lvh w repolarization changes, twi in leads I + avL, no prior ekgs to compare to; labs show hyperca; admit to medicine for further mgmt

## 2024-11-29 NOTE — H&P ADULT - NSHPPHYSICALEXAM_GEN_ALL_CORE
T(C): 36.7 (11-29-24 @ 19:04), Max: 36.9 (11-29-24 @ 13:29)  HR: 102 (11-29-24 @ 19:04) (95 - 102)  BP: 151/79 (11-29-24 @ 19:04) (116/76 - 151/79)  RR: 18 (11-29-24 @ 19:04) (18 - 18)  SpO2: 96% (11-29-24 @ 19:04) (94% - 100%)  GENERAL: NAD, lying in bed   EYES: EOMI, PERRLA; conjunctiva and sclera clear  ENMT: Moist oral mucosa, no pharyngeal injection or exudates   NECK: Supple, no palpable masses; no JVD  RESPIRATORY: Normal respiratory effort; lungs are clear to auscultation bilaterally  CARDIOVASCULAR: Regular rate and rhythm, normal S1 and S2, no murmur/rub/gallop; No lower extremity edema; Peripheral pulses are 2+ bilaterally  ABDOMEN: pain/tenderness over right groin area, normoactive bowel sounds, no rebound/guarding   MUSCULOSKELETAL:    no joint swelling or tenderness to palpation  PSYCH: A+O to person, place, and time; affect appropriate  NEUROLOGY: CN 2-12 are intact and symmetric; no gross motor or sensory deficits   SKIN: No rashes; no palpable lesions

## 2024-11-29 NOTE — ED PROVIDER NOTE - PROGRESS NOTE DETAILS
Earlier in patient stay spoke to Dr. Flanagan who sent patient to the emergency department to confirm plan for inpatient cardiac workup including echo and stress, Dr. Flanagan also requesting patient be ruled out for PE.  Advised once cleared from ED perspective patient can be admitted to Dr. Zhang and he will contact EP. Patient dimer was 403, age-adjusted still elevated.  Patient was ordered for CTA.  Hard stick initially had 22g placed in the hand which was not acceptable for CTA.  Attending attempted to place ultrasound IV without success, patient declining additional attempts for placing ultrasound or other IV.  Discussed with Dr. Zhang unable to obtain CTA at this time and pt accepted under his service  Sandra Zamudio PA-C

## 2024-11-29 NOTE — ED PROVIDER NOTE - OBJECTIVE STATEMENT
85yo F with PMHx of HTN, previous pericardial effusion s/p drain, uterine mass p/w abnormal EKG. She is set to have a hysterectomy on 12/5 and had a cardiology evaluation. Her EKG showed possible tachycardia and there was concern for possible PE and Dr. Flanagan requested admission for echo, CT and stress test. Patient denies any current symptoms.

## 2024-11-29 NOTE — ED ADULT NURSE NOTE - OBJECTIVE STATEMENT
Pt presents to ED from home c/o lower abdominal pain x2 weeks in which pt is supposed to get a hysterectomy on Dec.5th, pt went for presurgical testing and was found to have an abnormal EKG. Pt has hx of HTN, Asthma, PE.  Pt denies chest pain, chest palpitations, SOB, n/v/d, fever/chills, difficulty urinating at this time. Pt presents at baseline mental status, AAOx4. Plan of care and monitoring discussed with pt. Bed locked and lowered for safety. Safety and comfort maintained.

## 2024-11-30 LAB
24R-OH-CALCIDIOL SERPL-MCNC: 15.8 NG/ML — LOW (ref 30–80)
A1C WITH ESTIMATED AVERAGE GLUCOSE RESULT: 6.2 % — HIGH (ref 4–5.6)
ALBUMIN SERPL ELPH-MCNC: 3 G/DL — LOW (ref 3.3–5)
ALP SERPL-CCNC: 98 U/L — SIGNIFICANT CHANGE UP (ref 40–120)
ALT FLD-CCNC: 8 U/L — LOW (ref 10–45)
ANION GAP SERPL CALC-SCNC: 17 MMOL/L — SIGNIFICANT CHANGE UP (ref 5–17)
APTT BLD: 34.4 SEC — SIGNIFICANT CHANGE UP (ref 24.5–35.6)
AST SERPL-CCNC: 13 U/L — SIGNIFICANT CHANGE UP (ref 10–40)
B-OH-BUTYR SERPL-SCNC: 0.6 MMOL/L — HIGH
BASOPHILS # BLD AUTO: 0.05 K/UL — SIGNIFICANT CHANGE UP (ref 0–0.2)
BASOPHILS NFR BLD AUTO: 0.7 % — SIGNIFICANT CHANGE UP (ref 0–2)
BILIRUB SERPL-MCNC: 0.6 MG/DL — SIGNIFICANT CHANGE UP (ref 0.2–1.2)
BUN SERPL-MCNC: 12 MG/DL — SIGNIFICANT CHANGE UP (ref 7–23)
CA-I BLD-SCNC: 1.17 MMOL/L — SIGNIFICANT CHANGE UP (ref 1.15–1.33)
CALCIUM SERPL-MCNC: 8.7 MG/DL — SIGNIFICANT CHANGE UP (ref 8.4–10.5)
CALCIUM SERPL-MCNC: 9 MG/DL — SIGNIFICANT CHANGE UP (ref 8.4–10.5)
CHLORIDE SERPL-SCNC: 102 MMOL/L — SIGNIFICANT CHANGE UP (ref 96–108)
CHOLEST SERPL-MCNC: 171 MG/DL — SIGNIFICANT CHANGE UP
CO2 SERPL-SCNC: 21 MMOL/L — LOW (ref 22–31)
CREAT SERPL-MCNC: 0.68 MG/DL — SIGNIFICANT CHANGE UP (ref 0.5–1.3)
EGFR: 87 ML/MIN/1.73M2 — SIGNIFICANT CHANGE UP
EOSINOPHIL # BLD AUTO: 0.06 K/UL — SIGNIFICANT CHANGE UP (ref 0–0.5)
EOSINOPHIL NFR BLD AUTO: 0.9 % — SIGNIFICANT CHANGE UP (ref 0–6)
ESTIMATED AVERAGE GLUCOSE: 131 MG/DL — HIGH (ref 68–114)
GLUCOSE SERPL-MCNC: 85 MG/DL — SIGNIFICANT CHANGE UP (ref 70–99)
HCT VFR BLD CALC: 37.6 % — SIGNIFICANT CHANGE UP (ref 34.5–45)
HDLC SERPL-MCNC: 54 MG/DL — SIGNIFICANT CHANGE UP
HGB BLD-MCNC: 12.4 G/DL — SIGNIFICANT CHANGE UP (ref 11.5–15.5)
IMM GRANULOCYTES NFR BLD AUTO: 0.1 % — SIGNIFICANT CHANGE UP (ref 0–0.9)
INR BLD: 1.13 RATIO — SIGNIFICANT CHANGE UP (ref 0.85–1.16)
LACTATE SERPL-SCNC: 0.9 MMOL/L — SIGNIFICANT CHANGE UP (ref 0.5–2)
LIPID PNL WITH DIRECT LDL SERPL: 106 MG/DL — HIGH
LYMPHOCYTES # BLD AUTO: 1.83 K/UL — SIGNIFICANT CHANGE UP (ref 1–3.3)
LYMPHOCYTES # BLD AUTO: 26.3 % — SIGNIFICANT CHANGE UP (ref 13–44)
MCHC RBC-ENTMCNC: 29.7 PG — SIGNIFICANT CHANGE UP (ref 27–34)
MCHC RBC-ENTMCNC: 33 G/DL — SIGNIFICANT CHANGE UP (ref 32–36)
MCV RBC AUTO: 90.2 FL — SIGNIFICANT CHANGE UP (ref 80–100)
MONOCYTES # BLD AUTO: 0.83 K/UL — SIGNIFICANT CHANGE UP (ref 0–0.9)
MONOCYTES NFR BLD AUTO: 11.9 % — SIGNIFICANT CHANGE UP (ref 2–14)
NEUTROPHILS # BLD AUTO: 4.18 K/UL — SIGNIFICANT CHANGE UP (ref 1.8–7.4)
NEUTROPHILS NFR BLD AUTO: 60.1 % — SIGNIFICANT CHANGE UP (ref 43–77)
NON HDL CHOLESTEROL: 117 MG/DL — SIGNIFICANT CHANGE UP
NRBC # BLD: 0 /100 WBCS — SIGNIFICANT CHANGE UP (ref 0–0)
PLATELET # BLD AUTO: 457 K/UL — HIGH (ref 150–400)
POTASSIUM SERPL-MCNC: 3.7 MMOL/L — SIGNIFICANT CHANGE UP (ref 3.5–5.3)
POTASSIUM SERPL-SCNC: 3.7 MMOL/L — SIGNIFICANT CHANGE UP (ref 3.5–5.3)
PROT SERPL-MCNC: 7.7 G/DL — SIGNIFICANT CHANGE UP (ref 6–8.3)
PROTHROM AB SERPL-ACNC: 13 SEC — SIGNIFICANT CHANGE UP (ref 9.9–13.4)
PTH-INTACT FLD-MCNC: 60 PG/ML — SIGNIFICANT CHANGE UP (ref 15–65)
RBC # BLD: 4.17 M/UL — SIGNIFICANT CHANGE UP (ref 3.8–5.2)
RBC # FLD: 13.8 % — SIGNIFICANT CHANGE UP (ref 10.3–14.5)
SODIUM SERPL-SCNC: 140 MMOL/L — SIGNIFICANT CHANGE UP (ref 135–145)
TRIGL SERPL-MCNC: 58 MG/DL — SIGNIFICANT CHANGE UP
WBC # BLD: 6.96 K/UL — SIGNIFICANT CHANGE UP (ref 3.8–10.5)
WBC # FLD AUTO: 6.96 K/UL — SIGNIFICANT CHANGE UP (ref 3.8–10.5)

## 2024-11-30 RX ADMIN — OXYCODONE HYDROCHLORIDE 5 MILLIGRAM(S): 30 TABLET ORAL at 11:32

## 2024-11-30 RX ADMIN — SODIUM CHLORIDE 100 MILLILITER(S): 9 INJECTION, SOLUTION INTRAMUSCULAR; INTRAVENOUS; SUBCUTANEOUS at 06:46

## 2024-11-30 RX ADMIN — ACETAMINOPHEN, DIPHENHYDRAMINE HCL, PHENYLEPHRINE HCL 3 MILLIGRAM(S): 325; 25; 5 TABLET ORAL at 22:34

## 2024-11-30 RX ADMIN — Medication 2 TABLET(S): at 22:33

## 2024-11-30 RX ADMIN — OXYCODONE HYDROCHLORIDE 5 MILLIGRAM(S): 30 TABLET ORAL at 23:13

## 2024-11-30 RX ADMIN — OXYCODONE HYDROCHLORIDE 5 MILLIGRAM(S): 30 TABLET ORAL at 22:33

## 2024-11-30 RX ADMIN — Medication 60 MILLIGRAM(S): at 05:11

## 2024-11-30 RX ADMIN — ENOXAPARIN SODIUM 40 MILLIGRAM(S): 30 INJECTION SUBCUTANEOUS at 05:12

## 2024-11-30 NOTE — OCCUPATIONAL THERAPY INITIAL EVALUATION ADULT - LIVES WITH, PROFILE
Pt lives with daughter, pvt home, 4 steps to enter with handrail. 1st floor setup. (I) ADLs and transfers, however pt reports difficulty with dressing. Pt owns electric scooter (community only), uses rolling walker at home, straight cane for stairs. Owns raised toilet seat, but reports it's broken, and a shower chair. Daughter works, so patient is alone during the day,/children

## 2024-11-30 NOTE — CONSULT NOTE ADULT - SUBJECTIVE AND OBJECTIVE BOX
CHIEF COMPLAINT:Patient is a 84y old  Female who presents with a chief complaint of abnormal presurgical testing (29 Nov 2024 19:15)      HISTORY OF PRESENT ILLNESS:    84 Female with history of HTN, HLD,   with large ovarian / pelvic mass planned of resection   EKG ? atach sent to hospital for evaluation   no cp   has mild fatigue with exertion       PAST MEDICAL & SURGICAL HISTORY:  CAD (coronary artery disease)      HTN (hypertension)      HTN (hypertension)      Mass of uterine adnexa              MEDICATIONS:  enoxaparin Injectable 40 milliGRAM(s) SubCutaneous every 24 hours  NIFEdipine XL 60 milliGRAM(s) Oral daily        melatonin 3 milliGRAM(s) Oral at bedtime PRN  ondansetron Injectable 4 milliGRAM(s) IV Push every 8 hours PRN  oxycodone    5 mG/acetaminophen 325 mG 1 Tablet(s) Oral every 12 hours  oxyCODONE    IR 5 milliGRAM(s) Oral every 6 hours PRN    aluminum hydroxide/magnesium hydroxide/simethicone Suspension 30 milliLiter(s) Oral every 4 hours PRN  bisacodyl 5 milliGRAM(s) Oral daily PRN  polyethylene glycol 3350 17 Gram(s) Oral daily  senna 2 Tablet(s) Oral at bedtime      sodium chloride 0.9%. 1000 milliLiter(s) IV Continuous <Continuous>      FAMILY HISTORY:      Non-contributory    SOCIAL HISTORY:    No tobacco, drugs or etoh    Allergies    Allergy Status Unknown  No Known Allergies    Intolerances    	    REVIEW OF SYSTEMS:  as above  The rest of the 14 points ROS reviewed and except above they are unremarkable.        PHYSICAL EXAM:  T(C): 36.7 (11-30-24 @ 04:30), Max: 37.3 (11-29-24 @ 20:47)  HR: 100 (11-30-24 @ 04:30) (72 - 102)  BP: 136/79 (11-30-24 @ 04:30) (116/76 - 151/79)  RR: 18 (11-30-24 @ 04:30) (18 - 19)  SpO2: 96% (11-30-24 @ 04:30) (94% - 100%)  Wt(kg): --  I&O's Summary    29 Nov 2024 07:01  -  30 Nov 2024 07:00  --------------------------------------------------------  IN: 0 mL / OUT: 600 mL / NET: -600 mL        JVP: Normal  Neck: supple  Lung: clear   CV: S1 S2 , Murmur:  Abd: soft  Ext: No edema  neuro: Awake / alert  Psych: flat affect  Skin: normal      LABS/DATA:    TELEMETRY: 	  Sinus, first degree AVB  ECG:  	   	  CARDIAC MARKERS:    < from: Xray Chest 1 View AP/PA (11.29.24 @ 14:52) >    ACC: [id number] EXAM:  XR CHEST AP OR PA 1V   ORDERED BY: DEMARIO LAYTON     PROCEDURE DATE:  11/29/2024          INTERPRETATION:  EXAMINATION: XR CHEST    CLINICAL INDICATION: sob    TECHNIQUE: Single frontal, portable view of the chest was obtained.    COMPARISON: Chest x-ray None.    FINDINGS:  The heart is normal in size.  The lungs are clear.  There is no pneumothorax or pleural effusion.    IMPRESSION:  Clear lungs.    --- End of Report ---    < end of copied text >                      7 <<== 11-29-24 @ 15:32                              12.4   6.96  )-----------( 457      ( 30 Nov 2024 07:04 )             37.6     11-29    136  |  101  |  15  ----------------------------<  90  4.4   |  17[L]  |  0.85    Ca    11.1[H]      29 Nov 2024 15:32  Mg     2.0     11-29    TPro  8.4[H]  /  Alb  2.4[L]  /  TBili  0.8  /  DBili  x   /  AST  29  /  ALT  8[L]  /  AlkPhos  102  11-29    proBNP:   Lipid Profile:   HgA1c:   TSH:

## 2024-11-30 NOTE — PROGRESS NOTE ADULT - ASSESSMENT
83yo f, former smoker, w pmh htn, hld, cad, oa w chronic lbp/lumbar radiculopathy + s/p l thr, recently found r ovarian cystic mass for which she is tentatively scheduled for, "Robotic assisted excision of pelvic mass, TLH BSO, possible LN dissection, omentectomy staging," on 12/5, p/w abnormal presurgical testing, w abnormal ekg; in er, ekg showing accelerated junctional rhythm + lad, lafb + lvh w repolarization changes, twi in leads I + avL, no prior ekgs to compare to; labs show hyperca; admit to medicine for further mgmt

## 2024-11-30 NOTE — CONSULT NOTE ADULT - ASSESSMENT
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 malignancy   obtain cta rule out PE     Pre-Operative Cardiac Risk Stratification and Optimization  Based on patient history and physical exam, the patient is considered to have elevated risk given poor baseline ET   obtain echo   obtain pharm nuc stress test if ruled out for PE      Advanced care planning was discussed with patient and family.  Risks, benefits and alternatives of the cardiac treatments and medical therapy including procedures were discussed in detail and all questions were answered. Importance of compliance with medical therapy and lifestyle modification to improve cardiovascular health were addressed. Appropriate forms and patient educational materials were reviewed. 30 minutes face to face spent.

## 2024-11-30 NOTE — PROGRESS NOTE ADULT - SUBJECTIVE AND OBJECTIVE BOX
Name of Patient : ASHLI ORTEGA  MRN: [id number]      Subjective: Patient seen and examined. No new events except as noted.   doing okay     REVIEW OF SYSTEMS:    CONSTITUTIONAL: No weakness, fevers or chills  EYES/ENT: No visual changes;  No vertigo or throat pain   NECK: No pain or stiffness  RESPIRATORY: No cough, wheezing, hemoptysis; No shortness of breath  CARDIOVASCULAR: No chest pain or palpitations  GASTROINTESTINAL: No abdominal or epigastric pain. No nausea, vomiting, or hematemesis; No diarrhea or constipation. No melena or hematochezia.  GENITOURINARY: No dysuria, frequency or hematuria  NEUROLOGICAL: No numbness or weakness  SKIN: No itching, burning, rashes, or lesions   All other review of systems is negative unless indicated above.    MEDICATIONS:  MEDICATIONS  (STANDING):  enoxaparin Injectable 40 milliGRAM(s) SubCutaneous every 24 hours  melatonin 2 milliGRAM(s) Oral at bedtime  naloxone Injectable 0.4 milliGRAM(s) IV Push once  NIFEdipine XL 60 milliGRAM(s) Oral daily  oxycodone    5 mG/acetaminophen 325 mG 1 Tablet(s) Oral every 12 hours  polyethylene glycol 3350 17 Gram(s) Oral daily  senna 2 Tablet(s) Oral at bedtime  sodium chloride 0.9%. 1000 milliLiter(s) (100 mL/Hr) IV Continuous <Continuous>      PHYSICAL EXAM:  T(C): 36.8 (11-30-24 @ 21:02), Max: 36.8 (11-30-24 @ 16:21)  HR: 94 (11-30-24 @ 21:02) (70 - 100)  BP: 131/77 (11-30-24 @ 21:02) (106/78 - 136/79)  RR: 18 (11-30-24 @ 21:02) (18 - 18)  SpO2: 96% (11-30-24 @ 21:02) (94% - 98%)  Wt(kg): --  I&O's Summary    29 Nov 2024 07:01  -  30 Nov 2024 07:00  --------------------------------------------------------  IN: 0 mL / OUT: 600 mL / NET: -600 mL    30 Nov 2024 07:01  -  01 Dec 2024 00:23  --------------------------------------------------------  IN: 0 mL / OUT: 50 mL / NET: -50 mL          Appearance: Normal	  HEENT:  PERRLA   Lymphatic: No lymphadenopathy   Cardiovascular: Normal S1 S2, no JVD  Respiratory: normal effort , clear  Gastrointestinal:  Soft, Non-tender  Skin: No rashes,  warm to touch  Psychiatry:  Mood & affect appropriate  Musculuskeletal: No edema    recent labs, Imaging and EKGs personally reviewed     11-29-24 @ 07:01  -  11-30-24 @ 07:00  --------------------------------------------------------  IN: 0 mL / OUT: 600 mL / NET: -600 mL    11-30-24 @ 07:01  -  12-01-24 @ 00:23  --------------------------------------------------------  IN: 0 mL / OUT: 50 mL / NET: -50 mL                          12.4   6.96  )-----------( 457      ( 30 Nov 2024 07:04 )             37.6               11-30    140  |  102  |  12  ----------------------------<  85  3.7   |  21[L]  |  0.68    Ca    9.0      30 Nov 2024 07:07  Mg     2.0     11-29    TPro  7.7  /  Alb  3.0[L]  /  TBili  0.6  /  DBili  x   /  AST  13  /  ALT  8[L]  /  AlkPhos  98  11-30    PT/INR - ( 30 Nov 2024 07:04 )   PT: 13.0 sec;   INR: 1.13 ratio         PTT - ( 30 Nov 2024 07:04 )  PTT:34.4 sec                   Urinalysis Basic - ( 30 Nov 2024 07:07 )    Color: x / Appearance: x / SG: x / pH: x  Gluc: 85 mg/dL / Ketone: x  / Bili: x / Urobili: x   Blood: x / Protein: x / Nitrite: x   Leuk Esterase: x / RBC: x / WBC x   Sq Epi: x / Non Sq Epi: x / Bacteria: x

## 2024-11-30 NOTE — OCCUPATIONAL THERAPY INITIAL EVALUATION ADULT - PERTINENT HX OF CURRENT PROBLEM, REHAB EVAL
85yo f, former smoker, w pmh htn, hld, cad, oa w chronic lbp/lumbar radiculopathy + s/p l thr, recently found r ovarian cystic mass for which she is tentatively scheduled for, "Robotic assisted excision of pelvic mass, TLH BSO, possible LN dissection, omentectomy staging," on 12/5, p/w abnormal presurgical testing, w abnormal ekg; in er, ekg showing accelerated junctional rhythm + lad, lafb + lvh w repolarization changes, twi in leads I + avL, no prior ekgs to compare to; labs show hyperca; admit to medicine for further mgmt    X-Ray Chest 11/29: clear lungs.

## 2024-12-01 LAB
ANION GAP SERPL CALC-SCNC: 12 MMOL/L — SIGNIFICANT CHANGE UP (ref 5–17)
BUN SERPL-MCNC: 9 MG/DL — SIGNIFICANT CHANGE UP (ref 7–23)
CALCIUM SERPL-MCNC: 8.5 MG/DL — SIGNIFICANT CHANGE UP (ref 8.4–10.5)
CHLORIDE SERPL-SCNC: 104 MMOL/L — SIGNIFICANT CHANGE UP (ref 96–108)
CO2 SERPL-SCNC: 24 MMOL/L — SIGNIFICANT CHANGE UP (ref 22–31)
CREAT SERPL-MCNC: 0.59 MG/DL — SIGNIFICANT CHANGE UP (ref 0.5–1.3)
D DIMER BLD IA.RAPID-MCNC: 426 NG/ML DDU — HIGH
EGFR: 90 ML/MIN/1.73M2 — SIGNIFICANT CHANGE UP
GLUCOSE SERPL-MCNC: 98 MG/DL — SIGNIFICANT CHANGE UP (ref 70–99)
HCT VFR BLD CALC: 35.3 % — SIGNIFICANT CHANGE UP (ref 34.5–45)
HGB BLD-MCNC: 11.6 G/DL — SIGNIFICANT CHANGE UP (ref 11.5–15.5)
MAGNESIUM SERPL-MCNC: 1.9 MG/DL — SIGNIFICANT CHANGE UP (ref 1.6–2.6)
MCHC RBC-ENTMCNC: 29.8 PG — SIGNIFICANT CHANGE UP (ref 27–34)
MCHC RBC-ENTMCNC: 32.9 G/DL — SIGNIFICANT CHANGE UP (ref 32–36)
MCV RBC AUTO: 90.7 FL — SIGNIFICANT CHANGE UP (ref 80–100)
NRBC # BLD: 0 /100 WBCS — SIGNIFICANT CHANGE UP (ref 0–0)
PHOSPHATE SERPL-MCNC: 2.9 MG/DL — SIGNIFICANT CHANGE UP (ref 2.5–4.5)
PLATELET # BLD AUTO: 417 K/UL — HIGH (ref 150–400)
POTASSIUM SERPL-MCNC: 3.3 MMOL/L — LOW (ref 3.5–5.3)
POTASSIUM SERPL-SCNC: 3.3 MMOL/L — LOW (ref 3.5–5.3)
RBC # BLD: 3.89 M/UL — SIGNIFICANT CHANGE UP (ref 3.8–5.2)
RBC # FLD: 13.6 % — SIGNIFICANT CHANGE UP (ref 10.3–14.5)
SODIUM SERPL-SCNC: 140 MMOL/L — SIGNIFICANT CHANGE UP (ref 135–145)
WBC # BLD: 6.18 K/UL — SIGNIFICANT CHANGE UP (ref 3.8–10.5)
WBC # FLD AUTO: 6.18 K/UL — SIGNIFICANT CHANGE UP (ref 3.8–10.5)

## 2024-12-01 PROCEDURE — 72197 MRI PELVIS W/O & W/DYE: CPT | Mod: 26

## 2024-12-01 PROCEDURE — 71275 CT ANGIOGRAPHY CHEST: CPT | Mod: 26

## 2024-12-01 RX ORDER — POTASSIUM CHLORIDE 600 MG/1
20 TABLET, EXTENDED RELEASE ORAL
Refills: 0 | Status: COMPLETED | OUTPATIENT
Start: 2024-12-01 | End: 2024-12-01

## 2024-12-01 RX ORDER — ACETAMINOPHEN, DIPHENHYDRAMINE HCL, PHENYLEPHRINE HCL 325; 25; 5 MG/1; MG/1; MG/1
2 TABLET ORAL AT BEDTIME
Refills: 0 | Status: COMPLETED | OUTPATIENT
Start: 2024-12-01 | End: 2024-12-01

## 2024-12-01 RX ADMIN — ACETAMINOPHEN, DIPHENHYDRAMINE HCL, PHENYLEPHRINE HCL 3 MILLIGRAM(S): 325; 25; 5 TABLET ORAL at 22:02

## 2024-12-01 RX ADMIN — OXYCODONE HYDROCHLORIDE 5 MILLIGRAM(S): 30 TABLET ORAL at 17:51

## 2024-12-01 RX ADMIN — OXYCODONE HYDROCHLORIDE 5 MILLIGRAM(S): 30 TABLET ORAL at 03:24

## 2024-12-01 RX ADMIN — ENOXAPARIN SODIUM 40 MILLIGRAM(S): 30 INJECTION SUBCUTANEOUS at 05:42

## 2024-12-01 RX ADMIN — Medication 2 TABLET(S): at 22:02

## 2024-12-01 RX ADMIN — ACETAMINOPHEN, DIPHENHYDRAMINE HCL, PHENYLEPHRINE HCL 2 MILLIGRAM(S): 325; 25; 5 TABLET ORAL at 00:23

## 2024-12-01 RX ADMIN — OXYCODONE HYDROCHLORIDE 5 MILLIGRAM(S): 30 TABLET ORAL at 11:32

## 2024-12-01 RX ADMIN — Medication 60 MILLIGRAM(S): at 05:41

## 2024-12-01 RX ADMIN — Medication 100 GRAM(S): at 11:28

## 2024-12-01 RX ADMIN — POTASSIUM CHLORIDE 20 MILLIEQUIVALENT(S): 600 TABLET, EXTENDED RELEASE ORAL at 11:25

## 2024-12-01 RX ADMIN — POLYETHYLENE GLYCOL 3350 17 GRAM(S): 17 POWDER, FOR SOLUTION ORAL at 11:28

## 2024-12-01 RX ADMIN — POTASSIUM CHLORIDE 20 MILLIEQUIVALENT(S): 600 TABLET, EXTENDED RELEASE ORAL at 15:13

## 2024-12-01 RX ADMIN — POTASSIUM CHLORIDE 20 MILLIEQUIVALENT(S): 600 TABLET, EXTENDED RELEASE ORAL at 14:14

## 2024-12-01 RX ADMIN — Medication 30 MILLILITER(S): at 20:09

## 2024-12-01 NOTE — DIETITIAN INITIAL EVALUATION ADULT - PROBLEM SELECTOR PLAN 1
h/o cad, pericardial window  no clinft of acs  ekg shows accelerated junctional rhythm, lad, lafb, lvh w repolarization changes, twi in leads I + avl; no prior ekg to compare to  trop 7  follow up tte; lipid profile, a1c; ctpa  monitor for chest pain, ekg changes  cards consult in am

## 2024-12-01 NOTE — DIETITIAN INITIAL EVALUATION ADULT - ORAL INTAKE PTA/DIET HISTORY
lives with daughter. cream of wheat, eggs, limon, sausage, home fries for breakfast, soup for lunch, protein, vegetable, starch for dinner. snacks on cheez its.

## 2024-12-01 NOTE — PROGRESS NOTE ADULT - SUBJECTIVE AND OBJECTIVE BOX
Subjective: Patient seen and examined. No new events except as noted.     SUBJECTIVE/ROS:  feels ok       MEDICATIONS:  MEDICATIONS  (STANDING):  enoxaparin Injectable 40 milliGRAM(s) SubCutaneous every 24 hours  naloxone Injectable 0.4 milliGRAM(s) IV Push once  NIFEdipine XL 60 milliGRAM(s) Oral daily  oxycodone    5 mG/acetaminophen 325 mG 1 Tablet(s) Oral every 12 hours  polyethylene glycol 3350 17 Gram(s) Oral daily  senna 2 Tablet(s) Oral at bedtime  sodium chloride 0.9%. 1000 milliLiter(s) (100 mL/Hr) IV Continuous <Continuous>      PHYSICAL EXAM:  T(C): 36.9 (12-01-24 @ 04:18), Max: 36.9 (12-01-24 @ 04:18)  HR: 84 (12-01-24 @ 04:18) (70 - 94)  BP: 131/81 (12-01-24 @ 04:18) (106/78 - 131/81)  RR: 18 (12-01-24 @ 04:18) (18 - 18)  SpO2: 96% (12-01-24 @ 04:18) (94% - 98%)  Wt(kg): --  I&O's Summary    30 Nov 2024 07:01  -  01 Dec 2024 07:00  --------------------------------------------------------  IN: 0 mL / OUT: 400 mL / NET: -400 mL            JVP: Normal  Neck: supple  Lung: clear   CV: S1 S2 , Murmur:  Abd: soft  Ext: No edema  neuro: Awake / alert  Psych: flat affect  Skin: normal``    LABS/DATA:    CARDIAC MARKERS:                                11.6   6.18  )-----------( 417      ( 01 Dec 2024 06:28 )             35.3     12-01    140  |  104  |  9   ----------------------------<  98  3.3[L]   |  24  |  0.59    Ca    8.5      01 Dec 2024 06:28  Phos  2.9     12-01  Mg     1.9     12-01    TPro  7.7  /  Alb  3.0[L]  /  TBili  0.6  /  DBili  x   /  AST  13  /  ALT  8[L]  /  AlkPhos  98  11-30    proBNP:   Lipid Profile:   HgA1c:   TSH:     TELE:  EKG:

## 2024-12-01 NOTE — PROGRESS NOTE ADULT - SUBJECTIVE AND OBJECTIVE BOX
Name of Patient : ASHLI ORTEGA  MRN: [id number]  Date of visit: 12-01-24       Subjective: Patient seen and examined. No new events except as noted.   doing okay     REVIEW OF SYSTEMS:    CONSTITUTIONAL: No weakness, fevers or chills  EYES/ENT: No visual changes;  No vertigo or throat pain   NECK: No pain or stiffness  RESPIRATORY: No cough, wheezing, hemoptysis; No shortness of breath  CARDIOVASCULAR: No chest pain or palpitations  GASTROINTESTINAL: No abdominal or epigastric pain. No nausea, vomiting, or hematemesis; No diarrhea or constipation. No melena or hematochezia.  GENITOURINARY: No dysuria, frequency or hematuria  NEUROLOGICAL: No numbness or weakness  SKIN: No itching, burning, rashes, or lesions   All other review of systems is negative unless indicated above.    MEDICATIONS:  MEDICATIONS  (STANDING):  enoxaparin Injectable 40 milliGRAM(s) SubCutaneous every 24 hours  naloxone Injectable 0.4 milliGRAM(s) IV Push once  NIFEdipine XL 60 milliGRAM(s) Oral daily  oxycodone    5 mG/acetaminophen 325 mG 1 Tablet(s) Oral every 12 hours  polyethylene glycol 3350 17 Gram(s) Oral daily  senna 2 Tablet(s) Oral at bedtime  sodium chloride 0.9%. 1000 milliLiter(s) (100 mL/Hr) IV Continuous <Continuous>      PHYSICAL EXAM:  T(C): 37.3 (12-01-24 @ 21:09), Max: 37.3 (12-01-24 @ 21:09)  HR: 84 (12-01-24 @ 21:09) (80 - 85)  BP: 122/75 (12-01-24 @ 21:09) (105/64 - 131/81)  RR: 18 (12-01-24 @ 21:09) (18 - 18)  SpO2: 96% (12-01-24 @ 21:09) (92% - 96%)  Wt(kg): --  I&O's Summary    30 Nov 2024 07:01  -  01 Dec 2024 07:00  --------------------------------------------------------  IN: 0 mL / OUT: 400 mL / NET: -400 mL    01 Dec 2024 07:01  -  01 Dec 2024 23:55  --------------------------------------------------------  IN: 480 mL / OUT: 150 mL / NET: 330 mL          Appearance: Normal	  HEENT:  PERRLA   Lymphatic: No lymphadenopathy   Cardiovascular: Normal S1 S2, no JVD  Respiratory: normal effort , clear  Gastrointestinal:  Soft, Non-tender  Skin: No rashes,  warm to touch  Psychiatry:  Mood & affect appropriate  Musculuskeletal: No edema    recent labs, Imaging and EKGs personally reviewed     11-30-24 @ 07:01  -  12-01-24 @ 07:00  --------------------------------------------------------  IN: 0 mL / OUT: 400 mL / NET: -400 mL    12-01-24 @ 07:01  -  12-01-24 @ 23:55  --------------------------------------------------------  IN: 480 mL / OUT: 150 mL / NET: 330 mL                            11.6   6.18  )-----------( 417      ( 01 Dec 2024 06:28 )             35.3               12-01    140  |  104  |  9   ----------------------------<  98  3.3[L]   |  24  |  0.59    Ca    8.5      01 Dec 2024 06:28  Phos  2.9     12-01  Mg     1.9     12-01    TPro  7.7  /  Alb  3.0[L]  /  TBili  0.6  /  DBili  x   /  AST  13  /  ALT  8[L]  /  AlkPhos  98  11-30    PT/INR - ( 30 Nov 2024 07:04 )   PT: 13.0 sec;   INR: 1.13 ratio         PTT - ( 30 Nov 2024 07:04 )  PTT:34.4 sec                   Urinalysis Basic - ( 01 Dec 2024 06:28 )    Color: x / Appearance: x / SG: x / pH: x  Gluc: 98 mg/dL / Ketone: x  / Bili: x / Urobili: x   Blood: x / Protein: x / Nitrite: x   Leuk Esterase: x / RBC: x / WBC x   Sq Epi: x / Non Sq Epi: x / Bacteria: x

## 2024-12-01 NOTE — DIETITIAN INITIAL EVALUATION ADULT - PERTINENT MEDS FT
MEDICATIONS  (STANDING):  enoxaparin Injectable 40 milliGRAM(s) SubCutaneous every 24 hours  naloxone Injectable 0.4 milliGRAM(s) IV Push once  NIFEdipine XL 60 milliGRAM(s) Oral daily  oxycodone    5 mG/acetaminophen 325 mG 1 Tablet(s) Oral every 12 hours  polyethylene glycol 3350 17 Gram(s) Oral daily  potassium chloride    Tablet ER 20 milliEquivalent(s) Oral every 2 hours  senna 2 Tablet(s) Oral at bedtime  sodium chloride 0.9%. 1000 milliLiter(s) (100 mL/Hr) IV Continuous <Continuous>    MEDICATIONS  (PRN):  aluminum hydroxide/magnesium hydroxide/simethicone Suspension 30 milliLiter(s) Oral every 4 hours PRN Dyspepsia  bisacodyl 5 milliGRAM(s) Oral daily PRN Constipation  melatonin 3 milliGRAM(s) Oral at bedtime PRN Insomnia  ondansetron Injectable 4 milliGRAM(s) IV Push every 8 hours PRN Nausea and/or Vomiting  oxyCODONE    IR 5 milliGRAM(s) Oral every 6 hours PRN for moderate pain

## 2024-12-01 NOTE — DIETITIAN INITIAL EVALUATION ADULT - PERTINENT LABORATORY DATA
12-01    140  |  104  |  9   ----------------------------<  98  3.3[L]   |  24  |  0.59    Ca    8.5      01 Dec 2024 06:28  Phos  2.9     12-01  Mg     1.9     12-01    TPro  7.7  /  Alb  3.0[L]  /  TBili  0.6  /  DBili  x   /  AST  13  /  ALT  8[L]  /  AlkPhos  98  11-30  A1C with Estimated Average Glucose Result: 6.2 % (11-30-24 @ 07:20)

## 2024-12-01 NOTE — PROGRESS NOTE ADULT - ASSESSMENT
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 malignancy   awaiting cta rule out PE     Pre-Operative Cardiac Risk Stratification and Optimization  Based on patient history and physical exam, the patient is considered to have elevated risk given poor baseline ET   obtain echo   obtain pharm nuc stress test if ruled out for PE      Advanced care planning was discussed with patient and family.  Risks, benefits and alternatives of the cardiac treatments and medical therapy including procedures were discussed in detail and all questions were answered. Importance of compliance with medical therapy and lifestyle modification to improve cardiovascular health were addressed. Appropriate forms and patient educational materials were reviewed. 30 minutes face to face spent.

## 2024-12-02 ENCOUNTER — RESULT REVIEW (OUTPATIENT)
Age: 84
End: 2024-12-02

## 2024-12-02 LAB
ADD ON TEST-SPECIMEN IN LAB: SIGNIFICANT CHANGE UP
ALBUMIN SERPL ELPH-MCNC: 3.1 G/DL — LOW (ref 3.3–5)
ALP SERPL-CCNC: 98 U/L — SIGNIFICANT CHANGE UP (ref 40–120)
ALT FLD-CCNC: 9 U/L — LOW (ref 10–45)
ANION GAP SERPL CALC-SCNC: 12 MMOL/L — SIGNIFICANT CHANGE UP (ref 5–17)
AST SERPL-CCNC: 15 U/L — SIGNIFICANT CHANGE UP (ref 10–40)
BASOPHILS # BLD AUTO: 0.04 K/UL — SIGNIFICANT CHANGE UP (ref 0–0.2)
BASOPHILS NFR BLD AUTO: 0.6 % — SIGNIFICANT CHANGE UP (ref 0–2)
BILIRUB SERPL-MCNC: 0.4 MG/DL — SIGNIFICANT CHANGE UP (ref 0.2–1.2)
BUN SERPL-MCNC: 7 MG/DL — SIGNIFICANT CHANGE UP (ref 7–23)
CALCIUM SERPL-MCNC: 8.9 MG/DL — SIGNIFICANT CHANGE UP (ref 8.4–10.5)
CHLORIDE SERPL-SCNC: 101 MMOL/L — SIGNIFICANT CHANGE UP (ref 96–108)
CO2 SERPL-SCNC: 24 MMOL/L — SIGNIFICANT CHANGE UP (ref 22–31)
CREAT SERPL-MCNC: 0.61 MG/DL — SIGNIFICANT CHANGE UP (ref 0.5–1.3)
EGFR: 89 ML/MIN/1.73M2 — SIGNIFICANT CHANGE UP
EOSINOPHIL # BLD AUTO: 0.12 K/UL — SIGNIFICANT CHANGE UP (ref 0–0.5)
EOSINOPHIL NFR BLD AUTO: 1.7 % — SIGNIFICANT CHANGE UP (ref 0–6)
GLUCOSE SERPL-MCNC: 93 MG/DL — SIGNIFICANT CHANGE UP (ref 70–99)
HCT VFR BLD CALC: 38.4 % — SIGNIFICANT CHANGE UP (ref 34.5–45)
HGB BLD-MCNC: 12.8 G/DL — SIGNIFICANT CHANGE UP (ref 11.5–15.5)
IMM GRANULOCYTES NFR BLD AUTO: 0.3 % — SIGNIFICANT CHANGE UP (ref 0–0.9)
LYMPHOCYTES # BLD AUTO: 1.78 K/UL — SIGNIFICANT CHANGE UP (ref 1–3.3)
LYMPHOCYTES # BLD AUTO: 25.5 % — SIGNIFICANT CHANGE UP (ref 13–44)
MAGNESIUM SERPL-MCNC: 2.1 MG/DL — SIGNIFICANT CHANGE UP (ref 1.6–2.6)
MCHC RBC-ENTMCNC: 30.1 PG — SIGNIFICANT CHANGE UP (ref 27–34)
MCHC RBC-ENTMCNC: 33.3 G/DL — SIGNIFICANT CHANGE UP (ref 32–36)
MCV RBC AUTO: 90.4 FL — SIGNIFICANT CHANGE UP (ref 80–100)
MONOCYTES # BLD AUTO: 0.94 K/UL — HIGH (ref 0–0.9)
MONOCYTES NFR BLD AUTO: 13.4 % — SIGNIFICANT CHANGE UP (ref 2–14)
NEUTROPHILS # BLD AUTO: 4.09 K/UL — SIGNIFICANT CHANGE UP (ref 1.8–7.4)
NEUTROPHILS NFR BLD AUTO: 58.5 % — SIGNIFICANT CHANGE UP (ref 43–77)
NRBC # BLD: 0 /100 WBCS — SIGNIFICANT CHANGE UP (ref 0–0)
PLATELET # BLD AUTO: 439 K/UL — HIGH (ref 150–400)
POTASSIUM SERPL-MCNC: 3.8 MMOL/L — SIGNIFICANT CHANGE UP (ref 3.5–5.3)
POTASSIUM SERPL-SCNC: 3.8 MMOL/L — SIGNIFICANT CHANGE UP (ref 3.5–5.3)
PROT SERPL-MCNC: 7.6 G/DL — SIGNIFICANT CHANGE UP (ref 6–8.3)
RBC # BLD: 4.25 M/UL — SIGNIFICANT CHANGE UP (ref 3.8–5.2)
RBC # FLD: 13.6 % — SIGNIFICANT CHANGE UP (ref 10.3–14.5)
SODIUM SERPL-SCNC: 137 MMOL/L — SIGNIFICANT CHANGE UP (ref 135–145)
WBC # BLD: 6.99 K/UL — SIGNIFICANT CHANGE UP (ref 3.8–10.5)
WBC # FLD AUTO: 6.99 K/UL — SIGNIFICANT CHANGE UP (ref 3.8–10.5)

## 2024-12-02 PROCEDURE — 78452 HT MUSCLE IMAGE SPECT MULT: CPT | Mod: 26

## 2024-12-02 PROCEDURE — 93016 CV STRESS TEST SUPVJ ONLY: CPT

## 2024-12-02 PROCEDURE — 93306 TTE W/DOPPLER COMPLETE: CPT | Mod: 26

## 2024-12-02 PROCEDURE — 93018 CV STRESS TEST I&R ONLY: CPT

## 2024-12-02 RX ORDER — INFLUENZA VIRUS VACCINE 15; 15; 15; 15 UG/.5ML; UG/.5ML; UG/.5ML; UG/.5ML
0.5 SUSPENSION INTRAMUSCULAR ONCE
Refills: 0 | Status: DISCONTINUED | OUTPATIENT
Start: 2024-12-02 | End: 2024-12-03

## 2024-12-02 RX ORDER — POTASSIUM CHLORIDE 600 MG/1
20 TABLET, EXTENDED RELEASE ORAL ONCE
Refills: 0 | Status: COMPLETED | OUTPATIENT
Start: 2024-12-02 | End: 2024-12-02

## 2024-12-02 RX ADMIN — OXYCODONE HYDROCHLORIDE 5 MILLIGRAM(S): 30 TABLET ORAL at 12:42

## 2024-12-02 RX ADMIN — OXYCODONE HYDROCHLORIDE 5 MILLIGRAM(S): 30 TABLET ORAL at 23:37

## 2024-12-02 RX ADMIN — Medication 2 TABLET(S): at 21:31

## 2024-12-02 RX ADMIN — Medication 30 MILLILITER(S): at 17:49

## 2024-12-02 RX ADMIN — Medication 30 MILLILITER(S): at 23:32

## 2024-12-02 RX ADMIN — Medication 60 MILLIGRAM(S): at 06:33

## 2024-12-02 RX ADMIN — OXYCODONE HYDROCHLORIDE 5 MILLIGRAM(S): 30 TABLET ORAL at 00:06

## 2024-12-02 RX ADMIN — ENOXAPARIN SODIUM 40 MILLIGRAM(S): 30 INJECTION SUBCUTANEOUS at 06:33

## 2024-12-02 RX ADMIN — POLYETHYLENE GLYCOL 3350 17 GRAM(S): 17 POWDER, FOR SOLUTION ORAL at 12:42

## 2024-12-02 RX ADMIN — POTASSIUM CHLORIDE 20 MILLIEQUIVALENT(S): 600 TABLET, EXTENDED RELEASE ORAL at 17:48

## 2024-12-02 RX ADMIN — ACETAMINOPHEN, DIPHENHYDRAMINE HCL, PHENYLEPHRINE HCL 3 MILLIGRAM(S): 325; 25; 5 TABLET ORAL at 23:37

## 2024-12-02 NOTE — PATIENT PROFILE ADULT - FUNCTIONAL ASSESSMENT - BASIC MOBILITY 2.
Urine sent to lab. Pt reporting partial relief of right flank pain. Pain at this time 8/10. 3 = A little assistance

## 2024-12-02 NOTE — PHYSICAL THERAPY INITIAL EVALUATION ADULT - ADDITIONAL COMMENTS
pt states she lives with her daughter in a  with 4 EMANUEL + handrail. there are no steps inside. she owns a rollator, wheelchair, motor scooter, shower chair and purewick. Pt reports she has a raised toilet seat, however it is broken at this time. she reports she is ind with the rollator in the home.

## 2024-12-02 NOTE — PHYSICAL THERAPY INITIAL EVALUATION ADULT - PLANNED THERAPY INTERVENTIONS, PT EVAL
Goal Pt will negotiate 4 steps w /one handrail 2 weeks ind/bed mobility training/gait training/transfer training

## 2024-12-02 NOTE — PROGRESS NOTE ADULT - ASSESSMENT
A/P: 84y F w/ PMHx of HTN. HLD, CAD, OA, R cystic ovarian mass found to have abnormal EKG on presurgical testing. She is HD#4. Patient is stable this AM.    Neuro: Pain managed with Percocet, Oxycodone PRN.   CV: Vital signs stable, AM CBC and CMP pending  Cards: Tele showing long AV delay consistent with first degree AVB, per Cards avoid AVN blockers  Pulm: Saturating well on room air  GI: Continue regular diet  : Voiding spontaneously  Heme: ambulation and SCDs for DVT ppx  ID: Afebrile  Endo: No active issues   Dispo: Pending TTE and Nuclear Stress Test, scheduled RA excision of pelvic mass, TLH BSO, possible LN dissection, omentectomy staging on 12/5    Sharon Kirk, PGY-1   A/P: 84y F w/ PMHx of HTN. HLD, CAD, OA, R cystic ovarian mass found to have abnormal EKG on presurgical testing. She is HD#4. Patient is stable this AM.    Neuro: Pain managed with Percocet, Oxycodone PRN.   CV: Vital signs stable, AM CBC and CMP pending  Cards: Tele showing long AV delay consistent with first degree AVB, per Cards avoid AVN blockers  Pulm: Saturating well on room air  GI: Continue regular diet  : Voiding spontaneously; denies vaginal bleeding, Pending MRI Pelvis read  Heme: ambulation and SCDs for DVT ppx  ID: Afebrile  Endo: No active issues   Dispo: Pending TTE and Nuclear Stress Test, scheduled RA excision of pelvic mass, TLH BSO, possible LN dissection, omentectomy staging on 12/5    Sharon Kirk, PGY-1

## 2024-12-02 NOTE — PATIENT PROFILE ADULT - FALL HARM RISK - HARM RISK INTERVENTIONS

## 2024-12-02 NOTE — PROGRESS NOTE ADULT - SUBJECTIVE AND OBJECTIVE BOX
Subjective: Patient seen and examined. No new events except as noted.     SUBJECTIVE/ROS:  nad      MEDICATIONS:  MEDICATIONS  (STANDING):  enoxaparin Injectable 40 milliGRAM(s) SubCutaneous every 24 hours  naloxone Injectable 0.4 milliGRAM(s) IV Push once  NIFEdipine XL 60 milliGRAM(s) Oral daily  oxycodone    5 mG/acetaminophen 325 mG 1 Tablet(s) Oral every 12 hours  polyethylene glycol 3350 17 Gram(s) Oral daily  senna 2 Tablet(s) Oral at bedtime  sodium chloride 0.9%. 1000 milliLiter(s) (100 mL/Hr) IV Continuous <Continuous>      PHYSICAL EXAM:  T(C): 36.9 (12-02-24 @ 05:05), Max: 37.3 (12-01-24 @ 21:09)  HR: 98 (12-02-24 @ 05:05) (80 - 99)  BP: 123/79 (12-02-24 @ 05:05) (105/64 - 123/79)  RR: 18 (12-02-24 @ 05:05) (18 - 18)  SpO2: 95% (12-02-24 @ 05:05) (92% - 98%)  Wt(kg): --  I&O's Summary    01 Dec 2024 07:01  -  02 Dec 2024 07:00  --------------------------------------------------------  IN: 480 mL / OUT: 650 mL / NET: -170 mL            JVP: Normal  Neck: supple  Lung: clear   CV: S1 S2 , Murmur:  Abd: soft  Ext: No edema  neuro: Awake / alert  Psych: flat affect  Skin: normal``    LABS/DATA:    CARDIAC MARKERS:                                12.8   6.99  )-----------( 439      ( 02 Dec 2024 06:37 )             38.4     12-02    137  |  101  |  7   ----------------------------<  93  3.8   |  24  |  0.61    Ca    8.9      02 Dec 2024 06:39  Phos  2.9     12-01  Mg     1.9     12-01    TPro  7.6  /  Alb  3.1[L]  /  TBili  0.4  /  DBili  x   /  AST  15  /  ALT  9[L]  /  AlkPhos  98  12-02    proBNP:   Lipid Profile:   HgA1c:   TSH:     TELE:  EKG:

## 2024-12-02 NOTE — PHYSICAL THERAPY INITIAL EVALUATION ADULT - NSPTDMEREC_GEN_A_CORE
Patient will require a rolling walker at home due to their Dx of decreased strength and balance to help complete Mobility Related Activities of Daily Living/rolling walker/toileting

## 2024-12-02 NOTE — PHYSICAL THERAPY INITIAL EVALUATION ADULT - NSTOILETINGEQUIP_GEN_A_PT
Pt will require a 3 in 1 bariatric commode as pt will be confined to a room without access to a bathroom./3:1 commode

## 2024-12-02 NOTE — PHYSICAL THERAPY INITIAL EVALUATION ADULT - GAIT DEVIATIONS NOTED, PT EVAL
decreased dedrick/increased time in double stance/decreased step length/decreased stride length/decreased weight-shifting ability

## 2024-12-02 NOTE — PHYSICAL THERAPY INITIAL EVALUATION ADULT - PERTINENT HX OF CURRENT PROBLEM, REHAB EVAL
85yo f, former smoker, w pmh htn, hld, cad, oa w chronic lbp/lumbar radiculopathy + s/p l thr, recently found r ovarian cystic mass for which she is tentatively scheduled for, "Robotic assisted excision of pelvic mass, TLH BSO, possible LN dissection, omentectomy staging," on 12/5, p/w abnormal presurgical testing, w abnormal ekg; in er, ekg showing accelerated junctional rhythm + lad, lafb + lvh w repolarization changes, twi in leads I + avL, no prior ekgs to compare to; labs show hyperca; admit to medicine for further mgmt  CT Angio: No central or lobar pulmonary embolism  Chest XR: clear lungs

## 2024-12-02 NOTE — PROGRESS NOTE ADULT - SUBJECTIVE AND OBJECTIVE BOX
R1 GYN Progress Note  HD#4    Patient seen and examined at bedside.  No acute events overnight. No acute complaints. Patient reports lower abdominal pain, however pain is manageable with Oxycodone and Percocet. She is tolerating a regular diet. She is passing gas and flatus. She is voiding spontaneously. denies CP, SOB, N/V, fevers, and chills, headaches dizziness, chest tightness, palpitations.    Vital Signs Last 24 Hours  T(C): 36.9 (12-02-24 @ 05:05), Max: 37.3 (12-01-24 @ 21:09)  HR: 98 (12-02-24 @ 05:05) (80 - 99)  BP: 123/79 (12-02-24 @ 05:05) (105/64 - 123/79)  RR: 18 (12-02-24 @ 05:05) (18 - 18)  SpO2: 95% (12-02-24 @ 05:05) (92% - 98%)    I&O's Summary    30 Nov 2024 07:01  -  01 Dec 2024 07:00  --------------------------------------------------------  IN: 0 mL / OUT: 400 mL / NET: -400 mL    01 Dec 2024 07:01  -  02 Dec 2024 06:52  --------------------------------------------------------  IN: 480 mL / OUT: 150 mL / NET: 330 mL        Physical Exam:  General: NAD, awake and alert, resting in bed  CV: Regular rate and rhythm, normal S1S2  Lungs: CTA b/l, equal breath sounds  Abdomen: +TTP along lower abdominal midline; Soft, normoactive bowel sounds  Ext: +tibial tenderness. no calf tenderness, +1 pitting edema in b/l LE    Labs:                        11.6   6.18  )-----------( 417      ( 01 Dec 2024 06:28 )             35.3   baso x      eos x      imm gran x      lymph x      mono x      poly x                            12.4   6.96  )-----------( 457      ( 30 Nov 2024 07:04 )             37.6   baso 0.7    eos 0.9    imm gran 0.1    lymph 26.3   mono 11.9   poly 60.1                         13.2   8.20  )-----------( 491      ( 29 Nov 2024 14:46 )             40.0   baso 1.0    eos 0.9    imm gran 0.2    lymph 27.2   mono 11.2   poly 59.5       MEDICATIONS  (STANDING):  enoxaparin Injectable 40 milliGRAM(s) SubCutaneous every 24 hours  naloxone Injectable 0.4 milliGRAM(s) IV Push once  NIFEdipine XL 60 milliGRAM(s) Oral daily  oxycodone    5 mG/acetaminophen 325 mG 1 Tablet(s) Oral every 12 hours  polyethylene glycol 3350 17 Gram(s) Oral daily  senna 2 Tablet(s) Oral at bedtime  sodium chloride 0.9%. 1000 milliLiter(s) (100 mL/Hr) IV Continuous <Continuous>    MEDICATIONS  (PRN):  aluminum hydroxide/magnesium hydroxide/simethicone Suspension 30 milliLiter(s) Oral every 4 hours PRN Dyspepsia  bisacodyl 5 milliGRAM(s) Oral daily PRN Constipation  melatonin 3 milliGRAM(s) Oral at bedtime PRN Insomnia  ondansetron Injectable 4 milliGRAM(s) IV Push every 8 hours PRN Nausea and/or Vomiting  oxyCODONE    IR 5 milliGRAM(s) Oral every 6 hours PRN for moderate pain       R1 GYN Progress Note  HD#4    Patient seen and examined at bedside.  No acute events overnight. No acute complaints. Patient reports lower abdominal pain, however pain is manageable with Oxycodone and Percocet. She is tolerating a regular diet. She is passing gas and flatus. She is voiding spontaneously. denies CP, SOB, N/V, fevers, and chills, headaches dizziness, chest tightness, palpitations. Denies vaginal bleeding.    Vital Signs Last 24 Hours  T(C): 36.9 (12-02-24 @ 05:05), Max: 37.3 (12-01-24 @ 21:09)  HR: 98 (12-02-24 @ 05:05) (80 - 99)  BP: 123/79 (12-02-24 @ 05:05) (105/64 - 123/79)  RR: 18 (12-02-24 @ 05:05) (18 - 18)  SpO2: 95% (12-02-24 @ 05:05) (92% - 98%)    I&O's Summary    30 Nov 2024 07:01  -  01 Dec 2024 07:00  --------------------------------------------------------  IN: 0 mL / OUT: 400 mL / NET: -400 mL    01 Dec 2024 07:01  -  02 Dec 2024 06:52  --------------------------------------------------------  IN: 480 mL / OUT: 150 mL / NET: 330 mL        Physical Exam:  General: NAD, awake and alert, resting in bed  CV: Regular rate and rhythm, normal S1S2  Lungs: CTA b/l, equal breath sounds  Abdomen: +TTP along lower abdominal midline; Soft, normoactive bowel sounds  Ext: +tibial tenderness. no calf tenderness, +1 pitting edema in b/l LE    Labs:                        11.6   6.18  )-----------( 417      ( 01 Dec 2024 06:28 )             35.3   baso x      eos x      imm gran x      lymph x      mono x      poly x                            12.4   6.96  )-----------( 457      ( 30 Nov 2024 07:04 )             37.6   baso 0.7    eos 0.9    imm gran 0.1    lymph 26.3   mono 11.9   poly 60.1                         13.2   8.20  )-----------( 491      ( 29 Nov 2024 14:46 )             40.0   baso 1.0    eos 0.9    imm gran 0.2    lymph 27.2   mono 11.2   poly 59.5       MEDICATIONS  (STANDING):  enoxaparin Injectable 40 milliGRAM(s) SubCutaneous every 24 hours  naloxone Injectable 0.4 milliGRAM(s) IV Push once  NIFEdipine XL 60 milliGRAM(s) Oral daily  oxycodone    5 mG/acetaminophen 325 mG 1 Tablet(s) Oral every 12 hours  polyethylene glycol 3350 17 Gram(s) Oral daily  senna 2 Tablet(s) Oral at bedtime  sodium chloride 0.9%. 1000 milliLiter(s) (100 mL/Hr) IV Continuous <Continuous>    MEDICATIONS  (PRN):  aluminum hydroxide/magnesium hydroxide/simethicone Suspension 30 milliLiter(s) Oral every 4 hours PRN Dyspepsia  bisacodyl 5 milliGRAM(s) Oral daily PRN Constipation  melatonin 3 milliGRAM(s) Oral at bedtime PRN Insomnia  ondansetron Injectable 4 milliGRAM(s) IV Push every 8 hours PRN Nausea and/or Vomiting  oxyCODONE    IR 5 milliGRAM(s) Oral every 6 hours PRN for moderate pain

## 2024-12-02 NOTE — PROVIDER CONTACT NOTE (OTHER) - ACTION/TREATMENT ORDERED:
Detail Level: Detailed Provider aware, will check labs. Quality 137: Melanoma: Continuity Of Care - Recall System: Patient information entered into a recall system that includes: target date for the next exam specified AND a process to follow up with patients regarding missed or unscheduled appointments Detail Level: Generalized When Should The Patient Follow-Up For Their Next Full-Body Skin Exam?: 6 Months Detail Level: Simple Detail Level: Zone

## 2024-12-02 NOTE — PHYSICAL THERAPY INITIAL EVALUATION ADULT - ORIENTATION, REHAB EVAL
no abrasions, no jaundice, no lesions, no pruritis, and no rashes. oriented to person, place, time and situation

## 2024-12-02 NOTE — PROGRESS NOTE ADULT - SUBJECTIVE AND OBJECTIVE BOX
Name of Patient : ASHLI ORTEGA  MRN: [id number]  Date of visit: 12-02-24 @ 16:45      Subjective: Patient seen and examined. No new events except as noted.   Patient seen earlier this AM. Offers no new complaints. Denies chest pain, palpitations   For TTE and NST    REVIEW OF SYSTEMS:    CONSTITUTIONAL: No weakness, fevers or chills  EYES/ENT: No visual changes;  No vertigo or throat pain   NECK: No pain or stiffness  RESPIRATORY: No cough, wheezing, hemoptysis; No shortness of breath  CARDIOVASCULAR: No chest pain or palpitations  GASTROINTESTINAL: No abdominal or epigastric pain. No nausea, vomiting, or hematemesis; No diarrhea or constipation. No melena or hematochezia.  GENITOURINARY: No dysuria, frequency or hematuria  NEUROLOGICAL: No numbness or weakness  SKIN: No itching, burning, rashes, or lesions   All other review of systems is negative unless indicated above.    MEDICATIONS:  MEDICATIONS  (STANDING):  enoxaparin Injectable 40 milliGRAM(s) SubCutaneous every 24 hours  influenza  Vaccine (HIGH DOSE) 0.5 milliLiter(s) IntraMuscular once  naloxone Injectable 0.4 milliGRAM(s) IV Push once  NIFEdipine XL 60 milliGRAM(s) Oral daily  oxycodone    5 mG/acetaminophen 325 mG 1 Tablet(s) Oral every 12 hours  polyethylene glycol 3350 17 Gram(s) Oral daily  potassium chloride    Tablet ER 20 milliEquivalent(s) Oral once  senna 2 Tablet(s) Oral at bedtime  sodium chloride 0.9%. 1000 milliLiter(s) (100 mL/Hr) IV Continuous <Continuous>      PHYSICAL EXAM:  T(C): 37.2 (12-02-24 @ 12:38), Max: 37.3 (12-01-24 @ 21:09)  HR: 83 (12-02-24 @ 12:38) (78 - 99)  BP: 119/78 (12-02-24 @ 12:38) (114/74 - 127/83)  RR: 18 (12-02-24 @ 12:38) (17 - 18)  SpO2: 95% (12-02-24 @ 12:38) (95% - 98%)  Wt(kg): --  I&O's Summary    01 Dec 2024 07:01  -  02 Dec 2024 07:00  --------------------------------------------------------  IN: 480 mL / OUT: 650 mL / NET: -170 mL          Appearance: Awake, lying down in bed 	  HEENT:  Eyes are open   Lymphatic: No lymphadenopathy   Cardiovascular: Normal S1 S2, no JVD  Respiratory: normal effort , clear  Gastrointestinal:  Soft, Non-tender  Skin: No rashes,  warm to touch  Psychiatry:  Mood & affect appropriate  Musculoskeletal: No edema         12-01-24 @ 07:01  -  12-02-24 @ 07:00  --------------------------------------------------------  IN: 480 mL / OUT: 650 mL / NET: -170 mL                            12.8   6.99  )-----------( 439      ( 02 Dec 2024 06:37 )             38.4               12-02    137  |  101  |  7   ----------------------------<  93  3.8   |  24  |  0.61    Ca    8.9      02 Dec 2024 06:39  Phos  2.9     12-01  Mg     2.1     12-02    TPro  7.6  /  Alb  3.1[L]  /  TBili  0.4  /  DBili  x   /  AST  15  /  ALT  9[L]  /  AlkPhos  98  12-02                       Urinalysis Basic - ( 02 Dec 2024 06:39 )    Color: x / Appearance: x / SG: x / pH: x  Gluc: 93 mg/dL / Ketone: x  / Bili: x / Urobili: x   Blood: x / Protein: x / Nitrite: x   Leuk Esterase: x / RBC: x / WBC x   Sq Epi: x / Non Sq Epi: x / Bacteria: x      < from: MR Pelvis w/wo IV Cont (12.01.24 @ 18:42) >    IMPRESSION:  12.8 cm right ovarian multiloculated cystic lesion. 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    Additional 4.0 cm complex cystic lesion in the right groin.    Small right hydrosalpinx.    < end of copied text >        < from: CT Angio Chest PE Protocol w/ IV Cont (12.01.24 @ 13:19) >  IMPRESSION:  No central or lobar pulmonary embolism.    < end of copied text >

## 2024-12-02 NOTE — PROGRESS NOTE ADULT - ASSESSMENT
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 malignancy   no evidence of PE     Pre-Operative Cardiac Risk Stratification and Optimization  Based on patient history and physical exam, the patient is considered to have elevated risk given poor baseline ET   obtain echo   obtain pharm nuc stress test     Advanced care planning was discussed with patient and family.  Risks, benefits and alternatives of the cardiac treatments and medical therapy including procedures were discussed in detail and all questions were answered. Importance of compliance with medical therapy and lifestyle modification to improve cardiovascular health were addressed. Appropriate forms and patient educational materials were reviewed. 30 minutes face to face spent.

## 2024-12-03 ENCOUNTER — TRANSCRIPTION ENCOUNTER (OUTPATIENT)
Age: 84
End: 2024-12-03

## 2024-12-03 VITALS
DIASTOLIC BLOOD PRESSURE: 74 MMHG | RESPIRATION RATE: 18 BRPM | TEMPERATURE: 99 F | HEART RATE: 92 BPM | SYSTOLIC BLOOD PRESSURE: 130 MMHG | OXYGEN SATURATION: 94 %

## 2024-12-03 PROCEDURE — 85730 THROMBOPLASTIN TIME PARTIAL: CPT

## 2024-12-03 PROCEDURE — 84100 ASSAY OF PHOSPHORUS: CPT

## 2024-12-03 PROCEDURE — 97165 OT EVAL LOW COMPLEX 30 MIN: CPT

## 2024-12-03 PROCEDURE — 72197 MRI PELVIS W/O & W/DYE: CPT | Mod: MC

## 2024-12-03 PROCEDURE — 83970 ASSAY OF PARATHORMONE: CPT

## 2024-12-03 PROCEDURE — 97161 PT EVAL LOW COMPLEX 20 MIN: CPT

## 2024-12-03 PROCEDURE — 83735 ASSAY OF MAGNESIUM: CPT

## 2024-12-03 PROCEDURE — 82306 VITAMIN D 25 HYDROXY: CPT

## 2024-12-03 PROCEDURE — 83605 ASSAY OF LACTIC ACID: CPT

## 2024-12-03 PROCEDURE — 85379 FIBRIN DEGRADATION QUANT: CPT

## 2024-12-03 PROCEDURE — 82310 ASSAY OF CALCIUM: CPT

## 2024-12-03 PROCEDURE — 85027 COMPLETE CBC AUTOMATED: CPT

## 2024-12-03 PROCEDURE — 93017 CV STRESS TEST TRACING ONLY: CPT

## 2024-12-03 PROCEDURE — 99285 EMERGENCY DEPT VISIT HI MDM: CPT | Mod: 25

## 2024-12-03 PROCEDURE — 80053 COMPREHEN METABOLIC PANEL: CPT

## 2024-12-03 PROCEDURE — 71045 X-RAY EXAM CHEST 1 VIEW: CPT

## 2024-12-03 PROCEDURE — 85610 PROTHROMBIN TIME: CPT

## 2024-12-03 PROCEDURE — A9585: CPT

## 2024-12-03 PROCEDURE — 80048 BASIC METABOLIC PNL TOTAL CA: CPT

## 2024-12-03 PROCEDURE — A9500: CPT

## 2024-12-03 PROCEDURE — 84484 ASSAY OF TROPONIN QUANT: CPT

## 2024-12-03 PROCEDURE — 83880 ASSAY OF NATRIURETIC PEPTIDE: CPT

## 2024-12-03 PROCEDURE — 82330 ASSAY OF CALCIUM: CPT

## 2024-12-03 PROCEDURE — 83519 RIA NONANTIBODY: CPT

## 2024-12-03 PROCEDURE — C8929: CPT

## 2024-12-03 PROCEDURE — 80061 LIPID PANEL: CPT

## 2024-12-03 PROCEDURE — 93005 ELECTROCARDIOGRAM TRACING: CPT

## 2024-12-03 PROCEDURE — 71275 CT ANGIOGRAPHY CHEST: CPT | Mod: MC

## 2024-12-03 PROCEDURE — 85025 COMPLETE CBC W/AUTO DIFF WBC: CPT

## 2024-12-03 PROCEDURE — 36415 COLL VENOUS BLD VENIPUNCTURE: CPT

## 2024-12-03 PROCEDURE — 83036 HEMOGLOBIN GLYCOSYLATED A1C: CPT

## 2024-12-03 PROCEDURE — 78452 HT MUSCLE IMAGE SPECT MULT: CPT | Mod: MC

## 2024-12-03 PROCEDURE — 82010 KETONE BODYS QUAN: CPT

## 2024-12-03 RX ADMIN — POLYETHYLENE GLYCOL 3350 17 GRAM(S): 17 POWDER, FOR SOLUTION ORAL at 12:52

## 2024-12-03 RX ADMIN — Medication 60 MILLIGRAM(S): at 05:05

## 2024-12-03 RX ADMIN — OXYCODONE HYDROCHLORIDE 5 MILLIGRAM(S): 30 TABLET ORAL at 12:52

## 2024-12-03 RX ADMIN — ENOXAPARIN SODIUM 40 MILLIGRAM(S): 30 INJECTION SUBCUTANEOUS at 05:05

## 2024-12-03 RX ADMIN — Medication 30 MILLILITER(S): at 09:45

## 2024-12-03 NOTE — DISCHARGE NOTE PROVIDER - NSDCFUSCHEDAPPT_GEN_ALL_CORE_FT
Donna Troy Physician [facility]  GYNONC [facility] [address]  Scheduled Appointment: 12/10/2024    Donna Troy  [facility]  [facility] Amb Surg [facility]  Scheduled Appointment: 12/10/2024    Donna Troy Physician [facility]  GYNONC [address] [facility]  Scheduled Appointment: 12/27/2024

## 2024-12-03 NOTE — DISCHARGE NOTE PROVIDER - CARE PROVIDER_API CALL
Donna Troy  Gynecologic Oncology  [address] [place] [address]  Phone: [phone]  Fax: [phone]  Follow Up Time:     Aleksander Simons  Internal Medicine  [address] [place] [address]  Phone: [phone]  Fax: [phone]  Follow Up Time:     Giovany Cerrato  [facility]  [address] [place] [address]  Phone: [phone]  Fax: [phone]  Follow Up Time:

## 2024-12-03 NOTE — PROGRESS NOTE ADULT - PROBLEM SELECTOR PLAN 3
outpatient gyn-onc documentation reviewed  CT chest abdomen pelvis 11/5/2024; There is a multiseptated cystic adnexal mass with associated coarse calcification measuring 8 x 10.7 x 10.1 ...No lymphadenopathy ....There is a 3.5 x 3.8 cystic collection with peripheral calcification noted in the right inguinal canal  tentatively planned for, "Robotic assisted excision of pelvic mass, TLH BSO, possible LN dissection, omentectomy staging," on 12/5  cont home pain percocet/oxycodone  outpatient follow up on discharge
outpatient gyn-onc documentation reviewed  CT chest abdomen pelvis 11/5/2024; There is a multiseptated cystic adnexal mass with associated coarse calcification measuring 8 x 10.7 x 10.1 ...No lymphadenopathy ....There is a 3.5 x 3.8 cystic collection with peripheral calcification noted in the right inguinal canal  tentatively planned for, "Robotic assisted excision of pelvic mass, TLH BSO, possible LN dissection, omentectomy staging," on 12/5  - MR Pelvis as noted above, planned for excision   cont home pain percocet/oxycodone  outpatient follow up on discharge  GYN eval appreciated; F/u recs
outpatient gyn-onc documentation reviewed  CT chest abdomen pelvis 11/5/2024; There is a multiseptated cystic adnexal mass with associated coarse calcification measuring 8 x 10.7 x 10.1 ...No lymphadenopathy ....There is a 3.5 x 3.8 cystic collection with peripheral calcification noted in the right inguinal canal  tentatively planned for, "Robotic assisted excision of pelvic mass, TLH BSO, possible LN dissection, omentectomy staging," on 12/5  cont home pain percocet/oxycodone  outpatient follow up on discharge
outpatient gyn-onc documentation reviewed  CT chest abdomen pelvis 11/5/2024; There is a multiseptated cystic adnexal mass with associated coarse calcification measuring 8 x 10.7 x 10.1 ...No lymphadenopathy ....There is a 3.5 x 3.8 cystic collection with peripheral calcification noted in the right inguinal canal  tentatively planned for, "Robotic assisted excision of pelvic mass, TLH BSO, possible LN dissection, omentectomy staging," on 12/5  - MR Pelvis as noted above, planned for excision   cont home pain percocet/oxycodone  outpatient follow up on discharge  GYN eval appreciated; F/u recs

## 2024-12-03 NOTE — PROGRESS NOTE ADULT - ASSESSMENT
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 malignancy   no evidence of PE     Pre-Operative Cardiac Risk Stratification and Optimization  Based on patient history and physical exam, the patient is considered to have elevated risk given poor baseline ET   echo shows preserved LV function   stress test shows no ischemia   can proceed to OR as planned

## 2024-12-03 NOTE — DISCHARGE NOTE PROVIDER - HOSPITAL COURSE
HPI:  85yo f, former smoker, w pmh htn, hld, cad, oa w chronic lbp/lumbar radiculopathy + s/p l thr, recently found r ovarian cystic mass for which she is tentatively scheduled for, "Robotic assisted excision of pelvic mass, TLH BSO, possible LN dissection, omentectomy staging," on 12/5, p/w abnormal presurgical testing, w abnormal ekg; in er, ekg showing accelerated junctional rhythm + lad, lafb + lvh w repolarization changes, twi in leads I + avL, no prior ekgs to compare to; labs show hyperca; admit to medicine for further mgmt (29 Nov 2024 19:15)    Pt admitted for an abnormal EKG showing accelerated junctional rhythm, lad, lafb, lvh w repolarization changes, twi in leads I + avl. D dimer was elevated but CT angio was negative for PE. NST performed. Pt to follow up with cardiology outpatient. Pt was also found to have hypercalcemia. CT Chest A/P shows adnexal mass. Pt is planned for excision outpatient and should follow up with outpatient gynecology on discharge    Important Medication Changes and Reason:    Active or Pending Issues Requiring Follow-up:  TTE    Advanced Directives:   [ x] Full code  [ ] DNR  [ ] Hospice    Discharge Diagnoses:         HPI: 85yo f, former smoker, w pmh htn, hld, cad, oa w chronic lbp/lumbar radiculopathy + s/p l thr, recently found r ovarian cystic mass for which she is tentatively scheduled for, "Robotic assisted excision of pelvic mass, TLH BSO, possible LN dissection, omentectomy staging," on 12/5, p/w abnormal presurgical testing, w abnormal ekg; in er, ekg showing accelerated junctional rhythm + lad, lafb + lvh w repolarization changes, twi in leads I + avL, no prior ekgs to compare to; labs show hyperca; admit to medicine for further mgmt (29 Nov 2024 19:15)  Hospital Course: Pt admitted for an abnormal EKG showing accelerated junctional rhythm, lad, lafb, lvh w repolarization changes, twi in leads I + avl. D dimer was elevated but CT angio was negative for PE.   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 Pt to follow up with cardiology outpatient. Pt was also found to have hypercalcemia. CT Chest A/P shows adnexal mass. Pt is planned for excision outpatient and should follow up with outpatient gynecology on discharge  Important Medication Changes and Reason:  No AV Nani blockers  Active or Pending Issues Requiring Follow-up:  GYN for scheduled surgery next week    Advanced Directives:   [ x] Full code  [ ] DNR  [ ] Hospice    Discharge Diagnoses:  "Abnormal EKG" - found to be long AV delay consistent with first degree AVB  Elevated D-dimer  Adnexal Mass

## 2024-12-03 NOTE — DISCHARGE NOTE NURSING/CASE MANAGEMENT/SOCIAL WORK - PATIENT PORTAL LINK FT
You can access the FollowMyHealth Patient Portal offered by [facility] by registering at the following website: [email or web address]. By joining [employer]’s FollowMyHealth portal, you will also be able to view your health information using other applications (apps) compatible with our system.

## 2024-12-03 NOTE — DISCHARGE NOTE PROVIDER - CARE PROVIDERS DIRECT ADDRESSES
[email or web address]

## 2024-12-03 NOTE — PROGRESS NOTE ADULT - PROBLEM SELECTOR PLAN 1
h/o cad, pericardial window  no clinft of acs  ekg shows accelerated junctional rhythm, lad, lafb, lvh w repolarization changes, twi in leads I + avl; no prior ekg to compare to  trop 7  follow up tte; lipid profile, a1c; ctpa  monitor for chest pain, ekg changes  cards consult appreciated
Immediate family member
h/o cad, pericardial window  no clinft of acs  ekg shows accelerated junctional rhythm, lad, lafb, lvh w repolarization changes, twi in leads I + avl; no prior ekg to compare to  trop 7  CT Angio negative for PE despite elevated dimer  TTE pending   NST pending   Monitor for chest pain, ekg changes  Cardio eval appreciated; F/u recs
h/o cad, pericardial window  no clinft of acs  ekg shows accelerated junctional rhythm, lad, lafb, lvh w repolarization changes, twi in leads I + avl; no prior ekg to compare to  trop 7  follow up tte; lipid profile, a1c; ctpa  monitor for chest pain, ekg changes  cards consult appreciated
h/o cad, pericardial window  no clinft of acs  ekg shows accelerated junctional rhythm, lad, lafb, lvh w repolarization changes, twi in leads I + avl; no prior ekg to compare to  trop 7  CT Angio negative for PE despite elevated dimer  TTE with EF 76 with normal LVRVSF   NST with small-sized, mild defect(s) in the basal anterolateral and basal inferolateral walls that are fixed suggestive of an infarct.  Monitor for chest pain, ekg changes  Cardio eval appreciated; F/u recs

## 2024-12-03 NOTE — PROGRESS NOTE ADULT - PROVIDER SPECIALTY LIST ADULT
Cardiology
Cardiology
GYN
Cardiology
Internal Medicine

## 2024-12-03 NOTE — DISCHARGE NOTE PROVIDER - NSDCFUADDINST_GEN_ALL_CORE_FT
Make appointment(s) to follow up with your Healthcare Provider(s) - bring all discharge paperwork including discharge medication list to follow up appointment.  When making appointment, inform office you have been recently discharged from the hospital.  Anticipate planned surgery next week (12/10/24)

## 2024-12-03 NOTE — DISCHARGE NOTE PROVIDER - PROVIDER TOKENS
PROVIDER:[TOKEN:[[id number]]],PROVIDER:[TOKEN:[[id number]]],PROVIDER:[TOKEN:[[id number]]]

## 2024-12-03 NOTE — PROGRESS NOTE ADULT - PROBLEM SELECTOR PLAN 5
"No chief complaint on file.      Initial /84  Ht 5' 1.5\" (1.562 m)  Wt 182 lb (82.6 kg)  LMP 05/21/2017  BMI 33.83 kg/m2 Estimated body mass index is 33.83 kg/(m^2) as calculated from the following:    Height as of this encounter: 5' 1.5\" (1.562 m).    Weight as of this encounter: 182 lb (82.6 kg).  Medication Reconciliation: complete    "
fu lipid profile

## 2024-12-03 NOTE — PROGRESS NOTE ADULT - SUBJECTIVE AND OBJECTIVE BOX
Name of Patient : ASHLI ORTEGA  MRN: [id number]  Date of visit: 12-02-24 @ 16:45      Subjective: Patient seen and examined. No new events except as noted.   - TTE WNL and NST with fixed defect   - Medical management   - Plan for DC today     REVIEW OF SYSTEMS:    CONSTITUTIONAL: No weakness, fevers or chills  EYES/ENT: No visual changes;  No vertigo or throat pain   NECK: No pain or stiffness  RESPIRATORY: No cough, wheezing, hemoptysis; No shortness of breath  CARDIOVASCULAR: No chest pain or palpitations  GASTROINTESTINAL: No abdominal or epigastric pain. No nausea, vomiting, or hematemesis; No diarrhea or constipation. No melena or hematochezia.  GENITOURINARY: No dysuria, frequency or hematuria  NEUROLOGICAL: No numbness or weakness  SKIN: No itching, burning, rashes, or lesions   All other review of systems is negative unless indicated above.    MEDICATIONS:  MEDICATIONS  (STANDING):  enoxaparin Injectable 40 milliGRAM(s) SubCutaneous every 24 hours  influenza  Vaccine (HIGH DOSE) 0.5 milliLiter(s) IntraMuscular once  naloxone Injectable 0.4 milliGRAM(s) IV Push once  NIFEdipine XL 60 milliGRAM(s) Oral daily  oxycodone    5 mG/acetaminophen 325 mG 1 Tablet(s) Oral every 12 hours  polyethylene glycol 3350 17 Gram(s) Oral daily  potassium chloride    Tablet ER 20 milliEquivalent(s) Oral once  senna 2 Tablet(s) Oral at bedtime  sodium chloride 0.9%. 1000 milliLiter(s) (100 mL/Hr) IV Continuous <Continuous>      VITAL   T(C): 37.2 (12-02-24 @ 12:38), Max: 37.3 (12-01-24 @ 21:09)  HR: 83 (12-02-24 @ 12:38) (78 - 99)  BP: 119/78 (12-02-24 @ 12:38) (114/74 - 127/83)  RR: 18 (12-02-24 @ 12:38) (17 - 18)  SpO2: 95% (12-02-24 @ 12:38) (95% - 98%)  Wt(kg): --  I&O's Summary    01 Dec 2024 07:01  -  02 Dec 2024 07:00  --------------------------------------------------------  IN: 480 mL / OUT: 650 mL / NET: -170 mL        PHYSICAL EXAM:  General: NAD   Cards: S1/S2, no murmurs   Pulm: CTA bilaterally. No wheezes.   Abdomen: Soft, nondistended and nontender. BS (+)   Extremities: No pedal edema. DONYA of BL upper and lower extremities.  Neurology: AOx3 with no acute focal neurological deficits            12-01-24 @ 07:01  -  12-02-24 @ 07:00  --------------------------------------------------------  IN: 480 mL / OUT: 650 mL / NET: -170 mL                            12.8   6.99  )-----------( 439      ( 02 Dec 2024 06:37 )             38.4               12-02                 137  |  101  |  7   ----------------------------<  93  3.8   |  24  |  0.61    Ca    8.9      02 Dec 2024 06:39  Phos  2.9     12-01  Mg     2.1     12-02    TPro  7.6  /  Alb  3.1[L]  /  TBili  0.4  /  DBili  x   /  AST  15  /  ALT  9[L]  /  AlkPhos  98  12-02                       Urinalysis Basic - ( 02 Dec 2024 06:39 )    Color: x / Appearance: x / SG: x / pH: x  Gluc: 93 mg/dL / Ketone: x  / Bili: x / Urobili: x   Blood: x / Protein: x / Nitrite: x   Leuk Esterase: x / RBC: x / WBC x   Sq Epi: x / Non Sq Epi: x / Bacteria: x      < from: MR Pelvis w/wo IV Cont (12.01.24 @ 18:42) >    IMPRESSION:  12.8 cm right ovarian multiloculated cystic lesion. 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    Additional 4.0 cm complex cystic lesion in the right groin.    Small right hydrosalpinx.    < end of copied text >        < from: CT Angio Chest PE Protocol w/ IV Cont (12.01.24 @ 13:19) >  IMPRESSION:  No central or lobar pulmonary embolism.    < end of copied text >      TTE   < from: TTE W or WO Ultrasound Enhancing Agent (12.02.24 @ 10:08) >  1. Left ventricular cavity is normal in size. Left ventricular wall thickness is normal. Left ventricular systolic function is hyperdynamic with an ejection fraction of 76 % by Cosme's method of disks. There are no regional wall motion abnormalities seen.   2. Mild left ventricular hypertrophy.   3. Normal left ventricular diastolic function, with normal left ventricular filling pressure.   4. The right ventricle is not well visualized. Normal right ventricular cavity size, with normal wall thickness, and probably normal right ventricular systolic function.   5. Mild mitral valve leaflet calcification.   6. There is calcification of the aortic valve leaflets.   7. There is mild calcification of the m    < end of copied text >    NST   1. Myocardial Perfusion: Mildly Abnormal.   2. Qualitative Perfusion:      - small-sized, mild defect(s) in the basal anterolateral and basal inferolateral walls that are fixed suggestive of an infarct.   3. The left ventricle is normal in function and normal in size. The post stress left ventricular EF is 73 %. The stress end diastolic volume is 63 ml and systolic volume is 17 ml.   4. Hypokinesis of the basal anterolateral, basal inferolateral wall.

## 2024-12-03 NOTE — DISCHARGE NOTE PROVIDER - NSDCCPCAREPLAN_GEN_ALL_CORE_FT
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

## 2024-12-03 NOTE — DISCHARGE NOTE PROVIDER - NSDCMRMEDTOKEN_GEN_ALL_CORE_FT
Nifedical XL 60 mg oral tablet, extended release: 1 tab(s) orally once a day  oxyCODONE 5 mg oral tablet: 1 tab(s) orally every 6 hours as needed for  moderate pain breakthrough pain  oxycodone-acetaminophen 5 mg-325 mg oral tablet: 1 tab(s) orally every 12 hours   1 Rolling Walker: use as needed while ambulating  Bariatric Commode: use s needed  Nifedical XL 60 mg oral tablet, extended release: 1 tab(s) orally once a day  oxyCODONE 5 mg oral tablet: 1 tab(s) orally every 6 hours as needed for  moderate pain breakthrough pain  oxycodone-acetaminophen 5 mg-325 mg oral tablet: 1 tab(s) orally every 12 hours

## 2024-12-03 NOTE — DISCHARGE NOTE PROVIDER - NSDCFUADDAPPT_GEN_ALL_CORE_FT
APPTS ARE READY TO BE MADE: [X] YES    Best Family or Patient Contact (if needed):    Additional Information about above appointments (if needed):    1:   2:   3:     Other comments or requests:

## 2024-12-03 NOTE — CHART NOTE - NSCHARTNOTEFT_GEN_A_CORE
Patient will require a rolling walker at home due to deconditioning, weakness and chronic pain with history of OA.  The patient will be confined to one level in her home and there is no available toilet on that level, Due to her body habitus, she will require a bariatric commode

## 2024-12-03 NOTE — DISCHARGE NOTE NURSING/CASE MANAGEMENT/SOCIAL WORK - FINANCIAL ASSISTANCE
[facility] provides services at a reduced cost to those who are determined to be eligible through [facility]’s financial assistance program. Information regarding [facility]’s financial assistance program can be found by going to [email or web address] or by calling [phone].

## 2024-12-03 NOTE — PROGRESS NOTE ADULT - NS ATTEND AMEND GEN_ALL_CORE FT
Patient seen and examined by me. patient care and plan discussed and reviewed with PA. Plan as outlined above edited by me to reflect our discussion. Advanced care planning/advanced directives discussed with patient/family. DNR status including forceful chest compressions to attempt to restart the heart, ventilator support/artificial breathing, electric shock, artificial nutrition, health care proxy, Molst form all discussed with pt. More than 50% of the visit was spent counseling and/or coordinating care by the attending physician.
Patient seen and examined by me. patient care and plan discussed and reviewed with PA. Plan as outlined above edited by me to reflect our discussion.

## 2024-12-03 NOTE — DISCHARGE NOTE NURSING/CASE MANAGEMENT/SOCIAL WORK - NSDCDMETYPESERV_GEN_ALL_CORE_FT
Rolling walker, 3:1 commode.  durable medical equipment to be delivered to home once approved by insurance.

## 2024-12-03 NOTE — PROGRESS NOTE ADULT - REASON FOR ADMISSION
abnormal presurgical testing

## 2024-12-03 NOTE — PROGRESS NOTE ADULT - SUBJECTIVE AND OBJECTIVE BOX
Subjective: Patient seen and examined. No new events except as noted.     SUBJECTIVE/ROS:  No chest pain, dyspnea, palpitation, or dizziness.       MEDICATIONS:  MEDICATIONS  (STANDING):  enoxaparin Injectable 40 milliGRAM(s) SubCutaneous every 24 hours  influenza  Vaccine (HIGH DOSE) 0.5 milliLiter(s) IntraMuscular once  naloxone Injectable 0.4 milliGRAM(s) IV Push once  NIFEdipine XL 60 milliGRAM(s) Oral daily  oxycodone    5 mG/acetaminophen 325 mG 1 Tablet(s) Oral every 12 hours  polyethylene glycol 3350 17 Gram(s) Oral daily  senna 2 Tablet(s) Oral at bedtime  sodium chloride 0.9%. 1000 milliLiter(s) (100 mL/Hr) IV Continuous <Continuous>      PHYSICAL EXAM:  T(C): 37.2 (12-03-24 @ 04:38), Max: 37.2 (12-02-24 @ 12:38)  HR: 99 (12-03-24 @ 04:38) (78 - 99)  BP: 127/81 (12-03-24 @ 04:38) (114/66 - 128/75)  RR: 18 (12-03-24 @ 04:38) (17 - 18)  SpO2: 96% (12-03-24 @ 04:38) (94% - 97%)  Wt(kg): --  I&O's Summary          JVP: Normal  Neck: supple  Lung: clear   CV: S1 S2 , Murmur:  Abd: soft  Ext: No edema  neuro: Awake / alert  Psych: flat affect  Skin: normal``    LABS/DATA:    CARDIAC MARKERS:                                12.8   6.99  )-----------( 439      ( 02 Dec 2024 06:37 )             38.4     12-02    137  |  101  |  7   ----------------------------<  93  3.8   |  24  |  0.61    Ca    8.9      02 Dec 2024 06:39  Mg     2.1     12-02    TPro  7.6  /  Alb  3.1[L]  /  TBili  0.4  /  DBili  x   /  AST  15  /  ALT  9[L]  /  AlkPhos  98  12-02    proBNP:   Lipid Profile:   HgA1c:   TSH:     TELE:  EKG:

## 2024-12-05 RX ORDER — OXYCODONE 5 MG/1
5 TABLET ORAL
Qty: 20 | Refills: 0 | Status: ACTIVE | COMMUNITY
Start: 2024-12-05 | End: 1900-01-01

## 2024-12-08 LAB — PTH RELATED PROT SERPL-MCNC: <2 PMOL/L — SIGNIFICANT CHANGE UP

## 2024-12-09 ENCOUNTER — OUTPATIENT (OUTPATIENT)
Dept: OUTPATIENT SERVICES | Facility: HOSPITAL | Age: 84
LOS: 1 days | End: 2024-12-09
Payer: MEDICARE

## 2024-12-09 VITALS
OXYGEN SATURATION: 100 % | RESPIRATION RATE: 15 BRPM | DIASTOLIC BLOOD PRESSURE: 80 MMHG | TEMPERATURE: 98 F | HEART RATE: 80 BPM | SYSTOLIC BLOOD PRESSURE: 121 MMHG | HEIGHT: 64 IN | WEIGHT: 216.93 LBS

## 2024-12-09 DIAGNOSIS — Z90.49 ACQUIRED ABSENCE OF OTHER SPECIFIED PARTS OF DIGESTIVE TRACT: Chronic | ICD-10-CM

## 2024-12-09 DIAGNOSIS — Z91.89 OTHER SPECIFIED PERSONAL RISK FACTORS, NOT ELSEWHERE CLASSIFIED: ICD-10-CM

## 2024-12-09 DIAGNOSIS — Z98.890 OTHER SPECIFIED POSTPROCEDURAL STATES: Chronic | ICD-10-CM

## 2024-12-09 DIAGNOSIS — R19.09 OTHER INTRA-ABDOMINAL AND PELVIC SWELLING, MASS AND LUMP: ICD-10-CM

## 2024-12-09 DIAGNOSIS — J45.909 UNSPECIFIED ASTHMA, UNCOMPLICATED: ICD-10-CM

## 2024-12-09 DIAGNOSIS — R19.00 INTRA-ABDOMINAL AND PELVIC SWELLING, MASS AND LUMP, UNSPECIFIED SITE: ICD-10-CM

## 2024-12-09 DIAGNOSIS — F05 DELIRIUM DUE TO KNOWN PHYSIOLOGICAL CONDITION: ICD-10-CM

## 2024-12-09 DIAGNOSIS — Z96.642 PRESENCE OF LEFT ARTIFICIAL HIP JOINT: Chronic | ICD-10-CM

## 2024-12-09 DIAGNOSIS — R94.31 ABNORMAL ELECTROCARDIOGRAM [ECG] [EKG]: ICD-10-CM

## 2024-12-09 PROBLEM — I10 ESSENTIAL (PRIMARY) HYPERTENSION: Chronic | Status: INACTIVE | Noted: 2021-12-30 | Resolved: 2024-12-09

## 2024-12-09 LAB
APPEARANCE UR: ABNORMAL
BACTERIA # UR AUTO: ABNORMAL /HPF
BASOPHILS # BLD AUTO: 0.05 K/UL — SIGNIFICANT CHANGE UP (ref 0–0.2)
BASOPHILS NFR BLD AUTO: 0.6 % — SIGNIFICANT CHANGE UP (ref 0–2)
BILIRUB UR-MCNC: ABNORMAL
BLD GP AB SCN SERPL QL: POSITIVE — SIGNIFICANT CHANGE UP
CAST: 56 /LPF — HIGH (ref 0–4)
COD CRY URNS QL: PRESENT
COLOR SPEC: SIGNIFICANT CHANGE UP
DIFF PNL FLD: NEGATIVE — SIGNIFICANT CHANGE UP
EOSINOPHIL # BLD AUTO: 0.05 K/UL — SIGNIFICANT CHANGE UP (ref 0–0.5)
EOSINOPHIL NFR BLD AUTO: 0.6 % — SIGNIFICANT CHANGE UP (ref 0–6)
GLUCOSE UR QL: NEGATIVE MG/DL — SIGNIFICANT CHANGE UP
HCT VFR BLD CALC: 38.4 % — SIGNIFICANT CHANGE UP (ref 34.5–45)
HGB BLD-MCNC: 12.8 G/DL — SIGNIFICANT CHANGE UP (ref 11.5–15.5)
HYALINE CASTS # UR AUTO: PRESENT
IMM GRANULOCYTES NFR BLD AUTO: 0.2 % — SIGNIFICANT CHANGE UP (ref 0–0.9)
KETONES UR-MCNC: ABNORMAL MG/DL
LEUKOCYTE ESTERASE UR-ACNC: ABNORMAL
LYMPHOCYTES # BLD AUTO: 1.88 K/UL — SIGNIFICANT CHANGE UP (ref 1–3.3)
LYMPHOCYTES # BLD AUTO: 23.1 % — SIGNIFICANT CHANGE UP (ref 13–44)
MCHC RBC-ENTMCNC: 29.3 PG — SIGNIFICANT CHANGE UP (ref 27–34)
MCHC RBC-ENTMCNC: 33.3 G/DL — SIGNIFICANT CHANGE UP (ref 32–36)
MCV RBC AUTO: 87.9 FL — SIGNIFICANT CHANGE UP (ref 80–100)
MONOCYTES # BLD AUTO: 0.99 K/UL — HIGH (ref 0–0.9)
MONOCYTES NFR BLD AUTO: 12.2 % — SIGNIFICANT CHANGE UP (ref 2–14)
NEUTROPHILS # BLD AUTO: 5.14 K/UL — SIGNIFICANT CHANGE UP (ref 1.8–7.4)
NEUTROPHILS NFR BLD AUTO: 63.3 % — SIGNIFICANT CHANGE UP (ref 43–77)
NITRITE UR-MCNC: NEGATIVE — SIGNIFICANT CHANGE UP
PH UR: 6.5 — SIGNIFICANT CHANGE UP (ref 5–8)
PLATELET # BLD AUTO: 498 K/UL — HIGH (ref 150–400)
PROT UR-MCNC: 100 MG/DL
RBC # BLD: 4.37 M/UL — SIGNIFICANT CHANGE UP (ref 3.8–5.2)
RBC # FLD: 13.5 % — SIGNIFICANT CHANGE UP (ref 10.3–14.5)
RBC CASTS # UR COMP ASSIST: 20 /HPF — HIGH (ref 0–4)
REVIEW: SIGNIFICANT CHANGE UP
RH IG SCN BLD-IMP: NEGATIVE — SIGNIFICANT CHANGE UP
RH IG SCN BLD-IMP: NEGATIVE — SIGNIFICANT CHANGE UP
SP GR SPEC: 1.02 — SIGNIFICANT CHANGE UP (ref 1–1.03)
SQUAMOUS # UR AUTO: 21 /HPF — HIGH (ref 0–5)
UROBILINOGEN FLD QL: 2 MG/DL (ref 0.2–1)
WBC # BLD: 8.13 K/UL — SIGNIFICANT CHANGE UP (ref 3.8–10.5)
WBC # FLD AUTO: 8.13 K/UL — SIGNIFICANT CHANGE UP (ref 3.8–10.5)
WBC UR QL: 7 /HPF — HIGH (ref 0–5)

## 2024-12-09 PROCEDURE — 86077 PHYS BLOOD BANK SERV XMATCH: CPT

## 2024-12-09 RX ORDER — CHLORHEXIDINE GLUCONATE 1.2 MG/ML
1 RINSE ORAL ONCE
Refills: 0 | Status: COMPLETED | OUTPATIENT
Start: 2024-12-10 | End: 2024-12-10

## 2024-12-09 RX ORDER — OXYCODONE AND ACETAMINOPHEN 5; 325 MG/1; MG/1
1 TABLET ORAL
Refills: 0 | DISCHARGE

## 2024-12-09 RX ORDER — 0.9 % SODIUM CHLORIDE 0.9 %
1000 INTRAVENOUS SOLUTION INTRAVENOUS
Refills: 0 | Status: DISCONTINUED | OUTPATIENT
Start: 2024-12-10 | End: 2024-12-10

## 2024-12-09 NOTE — H&P PST ADULT - LAST ECHOCARDIOGRAM
2/dec/2024-EF 76%, LV hypertrophy, calcification of aortic valve leaflets- ( [facility]- [facility])

## 2024-12-09 NOTE — H&P PST ADULT - GENITOURINARY COMMENTS
right groin pain- admitted [facility]- 11/16/24 diagnosed with pelvic mass, followed up with gyn Dr Troy for surgery consultation pre op dx- pelvic mass

## 2024-12-09 NOTE — H&P PST ADULT - NSICDXPASTSURGICALHX_GEN_ALL_CORE_FT
PAST SURGICAL HISTORY:  H/O endoscopy     S/P appendectomy     S/P hip replacement, left     Status post creation of pericardial window

## 2024-12-09 NOTE — H&P PST ADULT - EKG AND INTERPRETATION
29/nov/24- SINUS RHYTHM WITH 1ST DEGREE A-V BLOCK  LEFT AXIS DEVIATION  MINIMAL VOLTAGE CRITERIA FOR LVH, MAY BE NORMAL VARIANT ( Saad product )  ABNORMAL ECG

## 2024-12-09 NOTE — ASU PATIENT PROFILE, ADULT - FALL HARM RISK - HARM RISK INTERVENTIONS

## 2024-12-09 NOTE — H&P PST ADULT - NSANTHOSAYNRD_GEN_A_CORE
No. GIN screening performed.  STOP BANG Legend: 0-2 = LOW Risk; 3-4 = INTERMEDIATE Risk; 5-8 = HIGH Risk

## 2024-12-09 NOTE — ASU PATIENT PROFILE, ADULT - PRESSURE ULCER(S)
no
Action 4: Continue
Hide Cerave Products: No
Detail Level: Zone
Continue Regimen: Vitamin e 1000 IU qd\\nAccutane 60mg qd

## 2024-12-09 NOTE — H&P PST ADULT - RESPIRATORY AND THORAX COMMENTS
patient reports of asthma- well controlled- on budesonide inhaler and takes prn nebulizer when exacerbated, last use of nebulizer - last year as per pt

## 2024-12-09 NOTE — H&P PST ADULT - HISTORY OF PRESENT ILLNESS
84year old  F with PMHx of HTN,TIA , previous pericardial effusion s/p drain, uterine mass p/w abnormal EKG.  Patient had a cardiology evaluation with Dr Flanagan, Her EKG showed possible tachycardia and there was concern for possible PE and Dr. Flanagan requested admission for echo, CT and stress test  Hospital Course: Pt admitted at [facility] - 29/nov/2024 for an abnormal EKG showing accelerated junctional rhythm, lad, lafb, lvh w repolarization changes, twi in leads I + avl. D dimer was elevated but CT angio was negative for PE. Patient was seen and evaluated by Dr Flanagan ( cardiologist) for surgery. Patient was initially admitted in [facility] on 11/16/24 for acute abdominal pain and diagnosed with pelvic mass. Patient presents to PST, for pre op evaluation prior to scheduled surgery- Robotic assisted total laparoscopic hysterectomy, bilateral salpingo oophorectomy excision of groin mass, possible staging with lymph node dissection omenectomy possible open, cystourethroscopy with Dr Troy.

## 2024-12-09 NOTE — H&P PST ADULT - PROBLEM SELECTOR PLAN 3
pt has long AV delay consistent with first degree AVB.  As per cardiologist Dr Flanagan= ER pre op evaluation -  Based on patient history and physical exam, the patient is considered to have elevated risk given poor baseline ET   echo shows preserved LV function   stress test shows no ischemia   can proceed to OR as planned.    Patient denies anychest pain, palpitations, CHRISTIANSEN now. pt has long AV delay consistent with first degree AVB.  As per cardiologist Dr Flanagan= ER pre op evaluation -  Based on patient history and physical exam, the patient is considered to have elevated risk given poor baseline ET   echo shows preserved LV function   stress test shows no ischemia   can proceed to OR as planned.  last cardiology note in [facility].    Patient denies anychest pain, palpitations, CHRISTIANSEN now.

## 2024-12-09 NOTE — H&P PST ADULT - FUNCTIONAL STATUS
Patient with chronic arthritis, advanced age, walks indoors wth rolling walker, can climb 4 steps with help, DASI SCORE-3.9 METs

## 2024-12-09 NOTE — H&P PST ADULT - PROBLEM SELECTOR PLAN 1
Mom would like pt to be seen for left ear pain since yesterday, painful to touch, he was swimming at a pool recently, mom belies there's fluid in there/suspected infection. Please give her a call back to schedule an appt, ok to leave detailed VM.    Patient is tentatively scheduled for Robotic assisted total laparoscopic hysterectomy, bilateral salpingo oophorectomy excision of groin mass, possible staging with lymph node dissection omenectomy possible open, cystourethroscopy with Dr Troy.- 12/10/24.    Pre-op instructions provided. Pt given verbal and written instructions with teach back on chlorhexidine wash and pepcid. Pt verbalized understanding with return demonstration.    BMP, A1C results - 12/2024- WNL in [facility] CBC, T&S,ABO,UA,Urine culture sent from PST today.    *** HTN  Continue on antihypertensive medication .    Chronic osteoarthritis  Patient instructed to take Oxycodone prn. with a sip of water on the morning of procedure.  Emailed pain management.

## 2024-12-09 NOTE — H&P PST ADULT - MUSCULOSKELETAL
details… decreased ROM due to pain/strength 5/5 bilateral upper extremities/strength 5/5 bilateral lower extremities/back exam/abnormal gait

## 2024-12-09 NOTE — H&P PST ADULT - PROBLEM SELECTOR PLAN 4
Postoperative Delirium Screen    Patient eligible for deena risk screen age>75? (if <= 75 then done)    Health care proxy paperwork given to patient? Yes (all patients should be given the packet to fill out at home and return on day of surgery to pre-op RN)    Impaired mobility (ie: uses cane, walker, wheelchair, or assist device)? Yes    Known dementia diagnosis? no    Impaired functional status (METS<4)? Yes    Malnutrition BMI<20? no    Emailed surgeon.

## 2024-12-09 NOTE — H&P PST ADULT - NSICDXPASTMEDICALHX_GEN_ALL_CORE_FT
PAST MEDICAL HISTORY:  Asthma     CAD (coronary artery disease)     Chronic back pain     HTN (hypertension)     Mass of uterine adnexa     Osteoarthritis     Pericardial effusion      PAST MEDICAL HISTORY:  Asthma     CAD (coronary artery disease)     Chronic back pain     HTN (hypertension)     Mass of uterine adnexa     Osteoarthritis     Pericardial effusion     Transient ischemic attack (TIA)

## 2024-12-09 NOTE — H&P PST ADULT - NEUROLOGICAL COMMENTS
TIA - 6 years ago , was taking aspirin for couple of years not now TIA - 6 years ago- no residuals , was taking aspirin for couple of years not now

## 2024-12-09 NOTE — ASU PATIENT PROFILE, ADULT - HEALTHCARE QUESTIONS, PROFILE
Limit your lifting to about 15lbs for 6 weeks.  You may shower but no tub baths or submersion in water for 6 weeks.  Nothing per vagina-no tampons, douching or intercourse for 6 weeks.  Notify your provider for fever, chills, worsening abdominal pain, foul odor to your vaginal discharge, vaginal bleeding that soaks a pad in a hour or less or passing clots, swelling in your hands face or feet or visual changes such as blurry or spotty vision.   
none

## 2024-12-09 NOTE — H&P PST ADULT - CARDIOVASCULAR COMMENTS
HTN, HLD, 2005- diagnosed with pericardial effusion - s/p pericardial window.reports she followed up with cardiologist intermittently in the past, admitted in [facility]  ER  29/nov/24-- with right groin pain and discomfort, diagnosed  ovarian  mass and had abnormal EKG- seen by cardiologist and evaluated for pre op

## 2024-12-10 ENCOUNTER — TRANSCRIPTION ENCOUNTER (OUTPATIENT)
Age: 84
End: 2024-12-10

## 2024-12-10 ENCOUNTER — RESULT REVIEW (OUTPATIENT)
Age: 84
End: 2024-12-10

## 2024-12-10 ENCOUNTER — APPOINTMENT (OUTPATIENT)
Dept: GYNECOLOGIC ONCOLOGY | Facility: HOSPITAL | Age: 84
End: 2024-12-10

## 2024-12-10 ENCOUNTER — INPATIENT (INPATIENT)
Facility: HOSPITAL | Age: 84
LOS: 2 days | Discharge: NURSING, REHAB OR LONG-TERM CARE | End: 2024-12-13
Attending: GENERAL ACUTE CARE HOSPITAL | Admitting: GENERAL ACUTE CARE HOSPITAL
Payer: MEDICARE

## 2024-12-10 VITALS
RESPIRATION RATE: 16 BRPM | HEIGHT: 64 IN | WEIGHT: 216.93 LBS | DIASTOLIC BLOOD PRESSURE: 57 MMHG | OXYGEN SATURATION: 95 % | HEART RATE: 109 BPM | SYSTOLIC BLOOD PRESSURE: 121 MMHG | TEMPERATURE: 99 F

## 2024-12-10 DIAGNOSIS — Z90.49 ACQUIRED ABSENCE OF OTHER SPECIFIED PARTS OF DIGESTIVE TRACT: Chronic | ICD-10-CM

## 2024-12-10 DIAGNOSIS — Z98.890 OTHER SPECIFIED POSTPROCEDURAL STATES: Chronic | ICD-10-CM

## 2024-12-10 DIAGNOSIS — R19.09 OTHER INTRA-ABDOMINAL AND PELVIC SWELLING, MASS AND LUMP: ICD-10-CM

## 2024-12-10 DIAGNOSIS — Z96.642 PRESENCE OF LEFT ARTIFICIAL HIP JOINT: Chronic | ICD-10-CM

## 2024-12-10 LAB
ANION GAP SERPL CALC-SCNC: 18 MMOL/L — HIGH (ref 7–14)
APTT BLD: 24.8 SEC — SIGNIFICANT CHANGE UP (ref 24.5–35.6)
BUN SERPL-MCNC: 9 MG/DL — SIGNIFICANT CHANGE UP (ref 7–23)
CALCIUM SERPL-MCNC: 9.4 MG/DL — SIGNIFICANT CHANGE UP (ref 8.4–10.5)
CHLORIDE SERPL-SCNC: 100 MMOL/L — SIGNIFICANT CHANGE UP (ref 98–107)
CO2 SERPL-SCNC: 21 MMOL/L — LOW (ref 22–31)
CREAT SERPL-MCNC: 0.81 MG/DL — SIGNIFICANT CHANGE UP (ref 0.5–1.3)
CULTURE RESULTS: SIGNIFICANT CHANGE UP
EGFR: 72 ML/MIN/1.73M2 — SIGNIFICANT CHANGE UP
GAS PNL BLDA: SIGNIFICANT CHANGE UP
GLUCOSE BLDC GLUCOMTR-MCNC: 112 MG/DL — HIGH (ref 70–99)
GLUCOSE SERPL-MCNC: 153 MG/DL — HIGH (ref 70–99)
HCT VFR BLD CALC: 39.7 % — SIGNIFICANT CHANGE UP (ref 34.5–45)
HGB BLD-MCNC: 13.5 G/DL — SIGNIFICANT CHANGE UP (ref 11.5–15.5)
INR BLD: 1.08 RATIO — SIGNIFICANT CHANGE UP (ref 0.85–1.16)
MAGNESIUM SERPL-MCNC: 1.7 MG/DL — SIGNIFICANT CHANGE UP (ref 1.6–2.6)
MCHC RBC-ENTMCNC: 29.8 PG — SIGNIFICANT CHANGE UP (ref 27–34)
MCHC RBC-ENTMCNC: 34 G/DL — SIGNIFICANT CHANGE UP (ref 32–36)
MCV RBC AUTO: 87.6 FL — SIGNIFICANT CHANGE UP (ref 80–100)
NRBC # BLD: 0 /100 WBCS — SIGNIFICANT CHANGE UP (ref 0–0)
NRBC # FLD: 0 K/UL — SIGNIFICANT CHANGE UP (ref 0–0)
PHOSPHATE SERPL-MCNC: 3.9 MG/DL — SIGNIFICANT CHANGE UP (ref 2.5–4.5)
PLATELET # BLD AUTO: 487 K/UL — HIGH (ref 150–400)
POTASSIUM SERPL-MCNC: 3.4 MMOL/L — LOW (ref 3.5–5.3)
POTASSIUM SERPL-SCNC: 3.4 MMOL/L — LOW (ref 3.5–5.3)
PROTHROM AB SERPL-ACNC: 12.9 SEC — SIGNIFICANT CHANGE UP (ref 9.9–13.4)
RBC # BLD: 4.53 M/UL — SIGNIFICANT CHANGE UP (ref 3.8–5.2)
RBC # FLD: 13.1 % — SIGNIFICANT CHANGE UP (ref 10.3–14.5)
SODIUM SERPL-SCNC: 139 MMOL/L — SIGNIFICANT CHANGE UP (ref 135–145)
SPECIMEN SOURCE: SIGNIFICANT CHANGE UP
WBC # BLD: 12.82 K/UL — HIGH (ref 3.8–10.5)
WBC # FLD AUTO: 12.82 K/UL — HIGH (ref 3.8–10.5)

## 2024-12-10 PROCEDURE — 88307 TISSUE EXAM BY PATHOLOGIST: CPT | Mod: 26

## 2024-12-10 PROCEDURE — S2900 ROBOTIC SURGICAL SYSTEM: CPT | Mod: NC

## 2024-12-10 PROCEDURE — 88305 TISSUE EXAM BY PATHOLOGIST: CPT | Mod: 26

## 2024-12-10 PROCEDURE — 49650 LAP ING HERNIA REPAIR INIT: CPT | Mod: GC,RT

## 2024-12-10 PROCEDURE — 88341 IMHCHEM/IMCYTCHM EA ADD ANTB: CPT | Mod: 26

## 2024-12-10 PROCEDURE — 58573 TLH W/T/O UTERUS OVER 250 G: CPT | Mod: 22

## 2024-12-10 PROCEDURE — 99291 CRITICAL CARE FIRST HOUR: CPT | Mod: 25

## 2024-12-10 PROCEDURE — 88304 TISSUE EXAM BY PATHOLOGIST: CPT | Mod: 26

## 2024-12-10 PROCEDURE — 88342 IMHCHEM/IMCYTCHM 1ST ANTB: CPT | Mod: 26

## 2024-12-10 PROCEDURE — 74019 RADEX ABDOMEN 2 VIEWS: CPT | Mod: 26

## 2024-12-10 DEVICE — VISTASEAL FIBRIN HUMAN 10ML: Type: IMPLANTABLE DEVICE | Status: FUNCTIONAL

## 2024-12-10 DEVICE — MESH HERNIA INGUINAL 3DMAX LARGE 10 X 16CM RIGHT: Type: IMPLANTABLE DEVICE | Status: FUNCTIONAL

## 2024-12-10 DEVICE — CLIP LIGATION TI ANGIO LG ORANGE: Type: IMPLANTABLE DEVICE | Status: FUNCTIONAL

## 2024-12-10 DEVICE — LIGATING CLIPS WECK HORIZON MEDIUM (BLUE) 24: Type: IMPLANTABLE DEVICE | Status: FUNCTIONAL

## 2024-12-10 RX ORDER — 0.9 % SODIUM CHLORIDE 0.9 %
1000 INTRAVENOUS SOLUTION INTRAVENOUS
Refills: 0 | Status: DISCONTINUED | OUTPATIENT
Start: 2024-12-10 | End: 2024-12-12

## 2024-12-10 RX ORDER — HEPARIN SODIUM,PORCINE 1000/ML
5000 VIAL (ML) INJECTION EVERY 8 HOURS
Refills: 0 | Status: DISCONTINUED | OUTPATIENT
Start: 2024-12-10 | End: 2024-12-13

## 2024-12-10 RX ORDER — BUDESONIDE/FORMOTEROL FUMARATE 80-4.5 MCG
2 HFA AEROSOL WITH ADAPTER (GRAM) INHALATION
Refills: 0 | DISCHARGE

## 2024-12-10 RX ORDER — CHLORHEXIDINE GLUCONATE 1.2 MG/ML
15 RINSE ORAL EVERY 12 HOURS
Refills: 0 | Status: DISCONTINUED | OUTPATIENT
Start: 2024-12-10 | End: 2024-12-11

## 2024-12-10 RX ORDER — ALBUTEROL 90 MCG
0 AEROSOL (GRAM) INHALATION
Refills: 0 | DISCHARGE

## 2024-12-10 RX ORDER — NIFEDIPINE 10 MG
1 CAPSULE ORAL
Refills: 0 | DISCHARGE

## 2024-12-10 RX ORDER — FENTANYL 12 UG/H
2.5 PATCH, EXTENDED RELEASE TRANSDERMAL
Qty: 2500 | Refills: 0 | Status: DISCONTINUED | OUTPATIENT
Start: 2024-12-10 | End: 2024-12-11

## 2024-12-10 RX ORDER — PROPOFOL 10 MG/ML
50 INJECTION, EMULSION INTRAVENOUS
Qty: 500 | Refills: 0 | Status: DISCONTINUED | OUTPATIENT
Start: 2024-12-10 | End: 2024-12-11

## 2024-12-10 RX ORDER — POTASSIUM CHLORIDE 600 MG/1
10 TABLET, EXTENDED RELEASE ORAL
Refills: 0 | Status: COMPLETED | OUTPATIENT
Start: 2024-12-10 | End: 2024-12-11

## 2024-12-10 RX ADMIN — PROPOFOL 29.5 MICROGRAM(S)/KG/MIN: 10 INJECTION, EMULSION INTRAVENOUS at 22:09

## 2024-12-10 RX ADMIN — CHLORHEXIDINE GLUCONATE 1 APPLICATION(S): 1.2 RINSE ORAL at 10:48

## 2024-12-10 RX ADMIN — FENTANYL 24.6 MICROGRAM(S)/KG/HR: 12 PATCH, EXTENDED RELEASE TRANSDERMAL at 22:09

## 2024-12-10 RX ADMIN — Medication 100 MILLILITER(S): at 22:59

## 2024-12-10 NOTE — BRIEF OPERATIVE NOTE - OPERATION/FINDINGS
Right indirect defect. Hernia sac reduced and broad ligament ligated. Please see separate GYN op note for their portion of operation.  Robotic assisted right LUIZA. Right indirect defect. Hernia sac reduced and broad ligament ligated. 3D Bard Max large mesh. Please see separate GYN op note for their portion of operation.  Robotic assisted right LUIZA. Right indirect defect. Hernia sac reduced and broad ligament ligated. Bard 3D Max large mesh. Please see separate GYN op note for their portion of operation.

## 2024-12-10 NOTE — BRIEF OPERATIVE NOTE - OPERATION/FINDINGS
EUA: cervix 1cm dilated. Unable to palpate uterus 2/2 body habitus. Unable to palpate adnexa. 3cm firm, mobile but non-reducible right vulvar mass  Laparoscopy: 8cm grossly normal uterus. Normal left ovary and fallopian tube. Right fallopian tube distorted by adnexal mass. Dense adhesions between omentum and anterior abdominal wall. Dense adhesions between bowel/omentum and right ovarian cyst which was also adhered to posterior uterus. Intraop rupture of ovarian cyst was dark red/brown blood. Normal large bowel and appendix. Bowel serosa was intact but abraded from dissection and was reinforced with suture. Bilateral ureters seen vermiculating retroperitoneally. Right inguinal hernia with cystic mass seen and taken down by general surgery along with mesh repair.  Cystoscopy: intact bladder dome with air bubble visualized. No suture or bleeding seen in bladder wall. Good efflux of urine bilaterally from ureteral orifices  Frozen of ovarian cyst was benign  Patient remained intubated at end of case out of precaution for airway given length of surgery and age. EUA: cervix 1cm dilated. Unable to palpate uterus 2/2 body habitus. Unable to palpate adnexa. 3cm subcutaneous firm, mobile but non-reducible right vulvar mass  Laparoscopy: 8cm grossly normal uterus. Normal left ovary and fallopian tube. Right fallopian tube distorted by adnexal mass. Dense adhesions between omentum and anterior abdominal wall. Dense adhesions between bowel/omentum and right ovarian cyst which was also adhered to posterior uterus. Intraop rupture of ovarian cyst was dark red/brown blood. Normal large bowel and appendix. Bowel serosa was intact but abraded from dissection and was reinforced with suture. Bilateral ureters seen vermiculating retroperitoneally. Right inguinal hernia with cystic mass seen and taken down by general surgery along with mesh repair.  Cystoscopy: intact bladder dome with air bubble visualized. No suture or bleeding seen in bladder wall. Good efflux of urine bilaterally from ureteral orifices  Frozen of ovarian cyst was benign  Patient remained intubated at end of case out of precaution for airway given length of surgery and age.

## 2024-12-10 NOTE — BRIEF OPERATIVE NOTE - SPECIMENS
1) part of ovarian cyst 2) uterus, cervix, bilateral fallopian tubes and ovaries 3) hernia sac 1) part of ovarian cyst#1, #2, #3 2) uterus, cervix, bilateral fallopian tubes and ovaries 3) hernia sac complains of pain/discomfort

## 2024-12-10 NOTE — PATIENT PROFILE ADULT - FALL HARM RISK - HARM RISK INTERVENTIONS
Communicate Risk of Fall with Harm to all staff/Bed in lowest position, wheels locked, appropriate side rails in place/Physically safe environment - no spills, clutter or unnecessary equipment/Purposeful Proactive Rounding/Room/bathroom lighting operational, light cord in reach

## 2024-12-10 NOTE — CHART NOTE - NSCHARTNOTEFT_GEN_A_CORE
General Surgery Post-Op Check    Patient seen and examined.     Vital Signs Last 24 Hrs  T(C): 35.2 (10 Dec 2024 20:55), Max: 37.1 (10 Dec 2024 10:50)  T(F): 95.4 (10 Dec 2024 20:55), Max: 98.8 (10 Dec 2024 10:50)  HR: 89 (10 Dec 2024 23:00) (73 - 109)  BP: 127/74 (10 Dec 2024 23:00) (121/57 - 150/78)  BP(mean): 88 (10 Dec 2024 23:00) (88 - 101)  RR: 16 (10 Dec 2024 23:00) (16 - 25)  SpO2: 100% (10 Dec 2024 23:00) (95% - 100%)    Parameters below as of 10 Dec 2024 23:00  Patient On (Oxygen Delivery Method): ventilator, A/C 16/450/5/40%    O2 Concentration (%): 40    I&O's Summary      Physical Exam  Gen: Sedated  Pulm: Intubated  CV: hemodynamically stable   Abd: Obese, Soft, Slightly Distended, Incisions covered with dry dressings.   Holliday in place.       A/P: 84y Female s/p Robot-assisted lap total hysterectomy with bilateral salpingo-oophorectomy and right inguinal hernia repair recovering in SICU, remains intubated.   - DVT ppx  - IVF  - NPO  - Intubated and sedated   - Holliday   - Monitor I/Os   - Care per gyn   - Care per SICU     B Team Surgery   [id number]

## 2024-12-10 NOTE — CHART NOTE - NSCHARTNOTEFT_GEN_A_CORE
Gynecological Oncology PA Post Op Note    Patient seen and examined, resting comfortably in bed, intubated and on propofol. Notified by SICU resident, patient to remain intubated overnight. Holliday in situ, adequate uop, clear, yellow in bag.     Vital Signs Last 24 Hours  T(C): 37.1 (12-10-24 @ 10:50), Max: 37.1 (12-10-24 @ 10:50)  HR: 91 (12-10-24 @ 22:26) (91 - 109)  BP: 121/57 (12-10-24 @ 10:50) (121/57 - 121/57)  RR: 16 (12-10-24 @ 10:50) (16 - 16)  SpO2: 100% (12-10-24 @ 22:26) (95% - 100%)    I&O's Summary      Physical Exam:  General: Intubated  Pulmonary: bilaterally clear to ascultation, non-labored respirations  CV: Regular rate and rhythm  Abdomen: soft, non-distended, scope sites C/D/I.   Extremities: no lower extremity edema or calve tenderness.     Labs:             13.5   12.82[H] )-----------( 487[H]    ( 12-10 @ 21:47 )             39.7                12.8   8.13  )-----------( 498[H]    ( 12-09 @ 07:31 )             38.4         MEDICATIONS  (STANDING):  chlorhexidine 0.12% Liquid 15 milliLiter(s) Oral Mucosa every 12 hours  fentaNYL   Infusion 2.5 MICROgram(s)/kG/Hr (24.6 mL/Hr) IV Continuous <Continuous>  heparin   Injectable 5000 Unit(s) SubCutaneous every 8 hours  lactated ringers. 1000 milliLiter(s) (100 mL/Hr) IV Continuous <Continuous>  propofol Infusion 50 MICROgram(s)/kG/Min (29.5 mL/Hr) IV Continuous <Continuous>    MEDICATIONS  (PRN):      Assessment and Plan    84y yo s/p Robot-assisted laparoscopic total hysterectomy with bilateral salpingo-oophorectomy (BSO) and cystoscopy for right ovarian cyst and  Inguinal hernia repair by Dr. Lemus  recovering well in acute post-operative state. See operative note for details. EBL: 200      -GYN/ONC to follow closely  -Appreciate excellent SICU care  -Disposition: SICU      Linda Antunez PA-C  #[id number] Gynecological Oncology PA Post Op Note    Patient seen and examined, resting comfortably in bed, intubated and on propofol. Notified by SICU resident, patient to remain intubated overnight. Holliday in situ, adequate uop, clear, yellow in bag.     Vital Signs Last 24 Hours  T(C): 37.1 (12-10-24 @ 10:50), Max: 37.1 (12-10-24 @ 10:50)  HR: 91 (12-10-24 @ 22:26) (91 - 109)  BP: 121/57 (12-10-24 @ 10:50) (121/57 - 121/57)  RR: 16 (12-10-24 @ 10:50) (16 - 16)  SpO2: 100% (12-10-24 @ 22:26) (95% - 100%)    I&O's Summary      Physical Exam:  General: Intubated  Pulmonary: bilaterally clear to ascultation, non-labored respirations  CV: Regular rate and rhythm  Abdomen: soft, non-distended, scope sites C/D/I.   Extremities: no lower extremity edema or calve tenderness.     Labs:             13.5   12.82[H] )-----------( 487[H]    ( 12-10 @ 21:47 )             39.7                12.8   8.13  )-----------( 498[H]    ( 12-09 @ 07:31 )             38.4         MEDICATIONS  (STANDING):  chlorhexidine 0.12% Liquid 15 milliLiter(s) Oral Mucosa every 12 hours  fentaNYL   Infusion 2.5 MICROgram(s)/kG/Hr (24.6 mL/Hr) IV Continuous <Continuous>  heparin   Injectable 5000 Unit(s) SubCutaneous every 8 hours  lactated ringers. 1000 milliLiter(s) (100 mL/Hr) IV Continuous <Continuous>  propofol Infusion 50 MICROgram(s)/kG/Min (29.5 mL/Hr) IV Continuous <Continuous>    MEDICATIONS  (PRN):      Assessment and Plan    84y yo s/p Robot-assisted laparoscopic total hysterectomy with bilateral salpingo-oophorectomy (BSO) and cystoscopy for right ovarian cyst(Frozen: benign) and  Inguinal hernia repair by Dr. Lemus  recovering well in acute post-operative state. See operative note for details. EBL: 200      -GYN/ONC to follow closely  -Appreciate excellent SICU care  -Disposition: SICU      Linda Antunez PA-C  #[id number]

## 2024-12-10 NOTE — CONSULT NOTE ADULT - ATTENDING COMMENTS
I agree with the history, physical, and plan, which I have reviewed and edited where appropriate.  I agree with notes/assessment of health care providers on my service.  I have personally examined the patient.  I was physically present for the key portions of the evaluation and management (E/M) service provided.  I reviewed data and laboratory tests/x-rays and all pertinent electronic images.  The patient is a critical care patient with life threatening hemodynamic and metabolic instability in SICU.  Risk benefit analyses discussed.    The patient is in SICU with diagnosis mentioned in the note.    The plan is specified below.    ASSESSMENT:  84 year old female with a past medical history significant for hypertension, TIA, pericardial effusion s/p drain, and uterine mass is now s/p a robot assisted laparoscopic total hysterectomy with bilateral salpingo-oophorectomy (BSO) and cystoscopy with Dr. Troy as well as a right inguinal hernia repair with Dr. Lemus. SICU was consulted due to concerns for laryngeal edema i/s/o prolonged trendelenberg positioning (7 hours). SICU to manage mechanical ventilation and perform delayed extubation when there is no  longer a concern for laryngeal edema.    NEUROLOGIC: postoperative pain, sedation while intubated    - Pain control: fentanyl, IV tylenol  - Sedation: propofol    RESPIRATORY: airway edema   - Mechanical ventilation volume AC: 16/450/5/50, wean to extubate in AM  - check cuff leak in am   - head of bed elevation     CARDIOVASCULAR   - Monitor hemodynamics   - MAP goal > 65    GASTROINTESTINAL   - Diet: NPO    /RENAL   - IV fluids: LR @ 100cc/hr  - Maintain del rio catheter    HEMATOLOGIC  - Monitor H/H   - DVT ppx: SQH & SCD's    INFECTIOUS DISEASE  - observe off abx     ENDOCRINE  - Monitor glucose

## 2024-12-10 NOTE — BRIEF OPERATIVE NOTE - NSICDXBRIEFPROCEDURE_GEN_ALL_CORE_FT
PROCEDURES:  Robot-assisted laparoscopic total hysterectomy with bilateral salpingo-oophorectomy (BSO) and cystoscopy using da Mumtaz Xi 10-Dec-2024 20:35:08  Lizbeth Delaney  
PROCEDURES:  Repair, hernia, inguinal, robot-assisted 10-Dec-2024 19:58:22  Heidi Rob

## 2024-12-10 NOTE — BRIEF OPERATIVE NOTE - NSICDXBRIEFPOSTOP_GEN_ALL_CORE_FT
POST-OP DIAGNOSIS:  Right ovarian cyst 10-Dec-2024 20:35:40  Lizbeth Delaney  
POST-OP DIAGNOSIS:  Right inguinal hernia 10-Dec-2024 19:58:40  Heidi Rob

## 2024-12-10 NOTE — BRIEF OPERATIVE NOTE - NSICDXBRIEFPREOP_GEN_ALL_CORE_FT
PRE-OP DIAGNOSIS:  Right inguinal hernia 10-Dec-2024 19:58:30  Heidi Rob  
PRE-OP DIAGNOSIS:  Right groin mass 10-Dec-2024 20:35:27  Lizbeth Delaney  Right ovarian cyst 10-Dec-2024 20:35:34  Lizbeth Delaney  
normal

## 2024-12-11 ENCOUNTER — TRANSCRIPTION ENCOUNTER (OUTPATIENT)
Age: 84
End: 2024-12-11

## 2024-12-11 LAB
ANION GAP SERPL CALC-SCNC: 18 MMOL/L — HIGH (ref 7–14)
BLOOD GAS ARTERIAL COMPREHENSIVE RESULT: SIGNIFICANT CHANGE UP
BUN SERPL-MCNC: 10 MG/DL — SIGNIFICANT CHANGE UP (ref 7–23)
CALCIUM SERPL-MCNC: 8.9 MG/DL — SIGNIFICANT CHANGE UP (ref 8.4–10.5)
CHLORIDE SERPL-SCNC: 100 MMOL/L — SIGNIFICANT CHANGE UP (ref 98–107)
CO2 SERPL-SCNC: 20 MMOL/L — LOW (ref 22–31)
CREAT SERPL-MCNC: 0.8 MG/DL — SIGNIFICANT CHANGE UP (ref 0.5–1.3)
EGFR: 74 ML/MIN/1.73M2 — SIGNIFICANT CHANGE UP
GAS PNL BLDA: SIGNIFICANT CHANGE UP
GLUCOSE SERPL-MCNC: 160 MG/DL — HIGH (ref 70–99)
HCT VFR BLD CALC: 37 % — SIGNIFICANT CHANGE UP (ref 34.5–45)
HGB BLD-MCNC: 12.6 G/DL — SIGNIFICANT CHANGE UP (ref 11.5–15.5)
MAGNESIUM SERPL-MCNC: 1.9 MG/DL — SIGNIFICANT CHANGE UP (ref 1.6–2.6)
MCHC RBC-ENTMCNC: 29.6 PG — SIGNIFICANT CHANGE UP (ref 27–34)
MCHC RBC-ENTMCNC: 34.1 G/DL — SIGNIFICANT CHANGE UP (ref 32–36)
MCV RBC AUTO: 86.9 FL — SIGNIFICANT CHANGE UP (ref 80–100)
NRBC # BLD: 0 /100 WBCS — SIGNIFICANT CHANGE UP (ref 0–0)
NRBC # FLD: 0 K/UL — SIGNIFICANT CHANGE UP (ref 0–0)
PHOSPHATE SERPL-MCNC: 3.7 MG/DL — SIGNIFICANT CHANGE UP (ref 2.5–4.5)
PLATELET # BLD AUTO: 413 K/UL — HIGH (ref 150–400)
POTASSIUM SERPL-MCNC: 3.1 MMOL/L — LOW (ref 3.5–5.3)
POTASSIUM SERPL-SCNC: 3.1 MMOL/L — LOW (ref 3.5–5.3)
RBC # BLD: 4.26 M/UL — SIGNIFICANT CHANGE UP (ref 3.8–5.2)
RBC # FLD: 13.2 % — SIGNIFICANT CHANGE UP (ref 10.3–14.5)
SODIUM SERPL-SCNC: 138 MMOL/L — SIGNIFICANT CHANGE UP (ref 135–145)
WBC # BLD: 12.21 K/UL — HIGH (ref 3.8–10.5)
WBC # FLD AUTO: 12.21 K/UL — HIGH (ref 3.8–10.5)

## 2024-12-11 PROCEDURE — 93010 ELECTROCARDIOGRAM REPORT: CPT

## 2024-12-11 PROCEDURE — 71045 X-RAY EXAM CHEST 1 VIEW: CPT | Mod: 26

## 2024-12-11 RX ORDER — HYDROMORPHONE HYDROCHLORIDE 2 MG/1
0.5 TABLET ORAL EVERY 4 HOURS
Refills: 0 | Status: DISCONTINUED | OUTPATIENT
Start: 2024-12-11 | End: 2024-12-12

## 2024-12-11 RX ORDER — DEXMEDETOMIDINE HYDROCHLORIDE 4 UG/ML
0.05 INJECTION, SOLUTION INTRAVENOUS
Qty: 400 | Refills: 0 | Status: DISCONTINUED | OUTPATIENT
Start: 2024-12-11 | End: 2024-12-11

## 2024-12-11 RX ORDER — IBUPROFEN 200 MG
600 TABLET ORAL EVERY 6 HOURS
Refills: 0 | Status: DISCONTINUED | OUTPATIENT
Start: 2024-12-11 | End: 2024-12-11

## 2024-12-11 RX ORDER — OXYCODONE HYDROCHLORIDE 30 MG/1
5 TABLET ORAL EVERY 6 HOURS
Refills: 0 | Status: DISCONTINUED | OUTPATIENT
Start: 2024-12-11 | End: 2024-12-13

## 2024-12-11 RX ORDER — POLYETHYLENE GLYCOL 3350 17 G/17G
17 POWDER, FOR SOLUTION ORAL DAILY
Refills: 0 | Status: DISCONTINUED | OUTPATIENT
Start: 2024-12-11 | End: 2024-12-13

## 2024-12-11 RX ORDER — OXYCODONE HYDROCHLORIDE 30 MG/1
2.5 TABLET ORAL EVERY 6 HOURS
Refills: 0 | Status: DISCONTINUED | OUTPATIENT
Start: 2024-12-11 | End: 2024-12-13

## 2024-12-11 RX ORDER — 0.9 % SODIUM CHLORIDE 0.9 %
1000 INTRAVENOUS SOLUTION INTRAVENOUS ONCE
Refills: 0 | Status: COMPLETED | OUTPATIENT
Start: 2024-12-11 | End: 2024-12-11

## 2024-12-11 RX ORDER — HYDRALAZINE HYDROCHLORIDE 10 MG/1
10 TABLET ORAL ONCE
Refills: 0 | Status: COMPLETED | OUTPATIENT
Start: 2024-12-11 | End: 2024-12-11

## 2024-12-11 RX ORDER — ACETAMINOPHEN 500MG 500 MG/1
950 TABLET, COATED ORAL EVERY 6 HOURS
Refills: 0 | Status: DISCONTINUED | OUTPATIENT
Start: 2024-12-11 | End: 2024-12-11

## 2024-12-11 RX ORDER — ACETAMINOPHEN 500MG 500 MG/1
975 TABLET, COATED ORAL EVERY 6 HOURS
Refills: 0 | Status: DISCONTINUED | OUTPATIENT
Start: 2024-12-11 | End: 2024-12-13

## 2024-12-11 RX ORDER — HYDROMORPHONE HYDROCHLORIDE 2 MG/1
1 TABLET ORAL EVERY 4 HOURS
Refills: 0 | Status: DISCONTINUED | OUTPATIENT
Start: 2024-12-11 | End: 2024-12-12

## 2024-12-11 RX ADMIN — Medication 5000 UNIT(S): at 15:27

## 2024-12-11 RX ADMIN — POTASSIUM CHLORIDE 100 MILLIEQUIVALENT(S): 600 TABLET, EXTENDED RELEASE ORAL at 00:39

## 2024-12-11 RX ADMIN — Medication 140 MILLILITER(S): at 08:35

## 2024-12-11 RX ADMIN — Medication 5000 UNIT(S): at 22:05

## 2024-12-11 RX ADMIN — OXYCODONE HYDROCHLORIDE 5 MILLIGRAM(S): 30 TABLET ORAL at 14:53

## 2024-12-11 RX ADMIN — POTASSIUM CHLORIDE 100 MILLIEQUIVALENT(S): 600 TABLET, EXTENDED RELEASE ORAL at 02:53

## 2024-12-11 RX ADMIN — HYDROMORPHONE HYDROCHLORIDE 0.5 MILLIGRAM(S): 2 TABLET ORAL at 23:04

## 2024-12-11 RX ADMIN — DEXMEDETOMIDINE HYDROCHLORIDE 1.23 MICROGRAM(S)/KG/HR: 4 INJECTION, SOLUTION INTRAVENOUS at 08:06

## 2024-12-11 RX ADMIN — Medication 25 GRAM(S): at 00:40

## 2024-12-11 RX ADMIN — OXYCODONE HYDROCHLORIDE 5 MILLIGRAM(S): 30 TABLET ORAL at 20:53

## 2024-12-11 RX ADMIN — Medication 1000 MILLILITER(S): at 15:27

## 2024-12-11 RX ADMIN — HYDROMORPHONE HYDROCHLORIDE 1 MILLIGRAM(S): 2 TABLET ORAL at 12:00

## 2024-12-11 RX ADMIN — PROPOFOL 29.5 MICROGRAM(S)/KG/MIN: 10 INJECTION, EMULSION INTRAVENOUS at 03:55

## 2024-12-11 RX ADMIN — POLYETHYLENE GLYCOL 3350 17 GRAM(S): 17 POWDER, FOR SOLUTION ORAL at 15:28

## 2024-12-11 RX ADMIN — ACETAMINOPHEN 500MG 975 MILLIGRAM(S): 500 TABLET, COATED ORAL at 23:23

## 2024-12-11 RX ADMIN — Medication 5000 UNIT(S): at 05:58

## 2024-12-11 RX ADMIN — Medication 140 MILLILITER(S): at 07:17

## 2024-12-11 RX ADMIN — FENTANYL 24.6 MICROGRAM(S)/KG/HR: 12 PATCH, EXTENDED RELEASE TRANSDERMAL at 08:36

## 2024-12-11 RX ADMIN — HYDRALAZINE HYDROCHLORIDE 10 MILLIGRAM(S): 10 TABLET ORAL at 05:58

## 2024-12-11 RX ADMIN — Medication 75 MILLILITER(S): at 23:47

## 2024-12-11 RX ADMIN — ACETAMINOPHEN 500MG 975 MILLIGRAM(S): 500 TABLET, COATED ORAL at 17:36

## 2024-12-11 RX ADMIN — PROPOFOL 29.5 MICROGRAM(S)/KG/MIN: 10 INJECTION, EMULSION INTRAVENOUS at 00:45

## 2024-12-11 RX ADMIN — DEXMEDETOMIDINE HYDROCHLORIDE 1.23 MICROGRAM(S)/KG/HR: 4 INJECTION, SOLUTION INTRAVENOUS at 06:30

## 2024-12-11 RX ADMIN — POTASSIUM CHLORIDE 100 MILLIEQUIVALENT(S): 600 TABLET, EXTENDED RELEASE ORAL at 01:40

## 2024-12-11 RX ADMIN — CHLORHEXIDINE GLUCONATE 15 MILLILITER(S): 1.2 RINSE ORAL at 05:58

## 2024-12-11 RX ADMIN — HYDROMORPHONE HYDROCHLORIDE 0.5 MILLIGRAM(S): 2 TABLET ORAL at 22:04

## 2024-12-11 RX ADMIN — HYDROMORPHONE HYDROCHLORIDE 1 MILLIGRAM(S): 2 TABLET ORAL at 12:36

## 2024-12-11 RX ADMIN — OXYCODONE HYDROCHLORIDE 5 MILLIGRAM(S): 30 TABLET ORAL at 15:23

## 2024-12-11 NOTE — PHYSICAL THERAPY INITIAL EVALUATION ADULT - GENERAL OBSERVATIONS, REHAB EVAL
Chart reviewed and cleared for PT by JEB Ceja. Pt received semi supine in bed in NAD, all lines intact + telemetry, IV, del rio, .

## 2024-12-11 NOTE — DISCHARGE NOTE PROVIDER - NSDCCPCAREPLAN_GEN_ALL_CORE_FT
PRINCIPAL DISCHARGE DIAGNOSIS  Diagnosis: Cyst of right ovary  Assessment and Plan of Treatment:

## 2024-12-11 NOTE — DISCHARGE NOTE PROVIDER - CARE PROVIDER_API CALL
Donna Troy  Gynecologic Oncology  [address] [place] [id number]  Phone: [phone]  Fax: [phone]  Scheduled Appointment: 12/27/2024

## 2024-12-11 NOTE — DIETITIAN INITIAL EVALUATION ADULT - OTHER CALCULATIONS
Dosing weight (12/10) 216.9 lbs / 98.4 kg.  Recent chart weight (11/22) 225 lbs.  Apparent ~8 lb (4%) weight loss x1 month, not clinically significant.  Ideal Body Weight: 120 lbs / 54.5 kg +/-10%

## 2024-12-11 NOTE — PHYSICAL THERAPY INITIAL EVALUATION ADULT - GAIT DISTANCE, PT EVAL
3 steps fwd/ bwd, distance limited due to decreased ability to weight bear into right LE due to prior knee injury/ arthritis

## 2024-12-11 NOTE — DISCHARGE NOTE PROVIDER - NSDCMRMEDTOKEN_GEN_ALL_CORE_FT
Albuterol: nebulizer PRN  atorvastatin 40 mg oral tablet: 1 tab(s) orally once a day  budesonide-formoterol 80 mcg-4.5 mcg/inh inhalation aerosol: 2 puff(s) inhaled prn  Nifedical XL 60 mg oral tablet, extended release: 1 tab(s) orally once a day  oxyCODONE 5 mg oral tablet: 1 tab(s) orally every 6 hours as needed for  moderate pain breakthrough pain   acetaminophen 325 mg oral tablet: 3 tab(s) orally every 6 hours  Albuterol: nebulizer PRN  atorvastatin 40 mg oral tablet: 1 tab(s) orally once a day  budesonide-formoterol 80 mcg-4.5 mcg/inh inhalation aerosol: 2 puff(s) inhaled prn  ibuprofen 600 mg oral tablet: 1 tab(s) orally every 6 hours as needed for  moderate pain  Nifedical XL 60 mg oral tablet, extended release: 1 tab(s) orally once a day  oxyCODONE 5 mg oral tablet: 1 tab(s) orally every 6 hours as needed for  severe pain MDD: 4 tabs

## 2024-12-11 NOTE — DIETITIAN INITIAL EVALUATION ADULT - PERTINENT MEDS FT
dexMEDEtomidine IV Continuous   fentaNYL IV Continuous   lactated ringers IV Continuous   dexMEDEtomidine IV Continuous   fentaNYL IV Continuous   lactated ringers IV Continuous

## 2024-12-11 NOTE — DISCHARGE NOTE PROVIDER - NSDCFUADDINST_GEN_ALL_CORE_FT
After your surgery it is normal to experience:    •	Vaginal bleeding- can last 1-2 weeks and should not be heavier than a period. It may come and go and be red, brown or pink. Use pads, not tampons.  •	Pain- This should be relieved by taking over the counter motrin/advil, Tylenol, or oxycodone. Alternate Tylenol and Motrin every 3 hours for pain control. Take oxycodone if your pain is not controlled by Tylenol/motrin.  •	Vaginal soreness/irritation- can occur in the first few days after surgery because of the instruments that were used in the vagina. Soreness can be treated with ice pack and irritation can be taken care of with an emollient such as balmex or aquaphor that you can put directly on the irritated area.    Restrictions: For 6 weeks after the surgery you should avoid the following:    •	Tampons  •	Sex  •	Vigorous gym exercise  •	Swimming  pools and tub baths  •	Wait a day or two before going back to work    Anesthesia Precautions:  For the next 12 hours do not:   •	drive a car,  •	 operate power tools or machinery,  •	drink alcohol, beer, or wine,   •	make important personal or business decisions    Diet:   •	Resume Regular diet but Progress diet slowly     Physician Notification- Warning signs to look out for  •	Heavy Vaginal Bleeding   •	Shortness of breath or chest pain  •	Severe Abdominal Pain  •	Persistent nausea and vomiting  •	Pain not relieved by medications  •	Fever greater than 100.4®F  •	Inability to tolerate liquids or foods  •	Unable to urinate after 8 hours After your surgery it is normal to experience:    •	Vaginal bleeding- can last 1-2 weeks and should not be heavier than spotting. It may come and go and be red, brown or pink. Use pads, not tampons.  •	Pain- This should be relieved by taking over the counter motrin/advil, Tylenol, or oxycodone. Alternate Tylenol and Motrin every 3 hours for pain control. Take oxycodone if your pain is not controlled by Tylenol/motrin.  •	Vaginal soreness/irritation- can occur in the first few days after surgery because of the instruments that were used in the vagina. Soreness can be treated with ice pack and irritation can be taken care of with an emollient such as balmex or aquaphor that you can put directly on the irritated area.    Restrictions: For 6 weeks after the surgery you should avoid the following:    •	Tampons  •	Sex  •	Vigorous gym exercise  •	Swimming  pools and tub baths  •	Wait a day or two before going back to work    Anesthesia Precautions:  For the next 12 hours do not:   •	drive a car,  •	 operate power tools or machinery,  •	drink alcohol, beer, or wine,   •	make important personal or business decisions    Diet:   •	Resume Regular diet but Progress diet slowly     Physician Notification- Warning signs to look out for  •	Heavy Vaginal Bleeding   •	Shortness of breath or chest pain  •	Severe Abdominal Pain  •	Persistent nausea and vomiting  •	Pain not relieved by medications  •	Fever greater than 100.4®F  •	Inability to tolerate liquids or foods  •	Unable to urinate after 8 hours

## 2024-12-11 NOTE — CONSULT NOTE ADULT - SUBJECTIVE AND OBJECTIVE BOX
SICU Consult Note    HPI:   84 year old female with a past medical history significant for hypertension, TIA, pericardial effusion s/p drain, and uterine mass presents for pre op evaluation prior to scheduled surgery: Robotic assisted total laparoscopic hysterectomy, bilateral salpingo oophorectomy excision of groin mass, possible staging with lymph node dissection omenectomy possible open, cystourethroscopy with Dr Troy. The pelvic mass was originally discovered on an admission to [facility] on 24 for acute abdominal pain. She had a subsequent hospital admission to [facility] on 2024 for an abnormal EKG with an accelerated junctional rhythm, LAD, LAFB, and LVH with repolarization, TWI in leads I and avL and elevated D-dimer. CT angio at the time was negative for PE. On this admission prior to surgery, patient had a cardiac workup due to possible tachycardia on her EKG and due to her recent abnormal EKG. The workup was negative and patient was cleared for surgery. Patient is now s/p a robot assisted laparoscopic total hysterectomy with bilateral salpingo-oophorectomy (BSO) and cystoscopy with Dr. Troy as well as a right inguinal hernia repair with Dr. Lemus. SICU was consulted due to concerns for laryngeal edema i/s/o prolonged trendelenberg positioning (7 hours). SICU to manage mechanical ventilation and perform delayed extubation when there is no  longer a concern for laryngeal edema.    PMH:   Asthma   CAD (coronary artery disease)   Chronic back pain   HTN (hypertension)   Mass of uterine adnexa   Osteoarthritis   Pericardial effusion   Transient ischemic attack (TIA).   Right inguinal hernia    PAST SURGICAL HISTORY:  H/O endoscopy   S/P appendectomy   S/P hip replacement, left   Status post creation of pericardial window.    ALLERGIES:  No Known Allergies      --------------------------------------------------------------------------------------    MEDICATIONS:    Neurologic Medications  fentaNYL    Injectable 25 MICROGram(s) IV Push every 5 minutes PRN Moderate Pain (4 - 6)  fentaNYL    Injectable 50 MICROGram(s) IV Push every 15 minutes PRN Severe Pain (7 - 10)  ondansetron Injectable 4 milliGRAM(s) IV Push once PRN Nausea and/or Vomiting    Respiratory Medications    Cardiovascular Medications    Gastrointestinal Medications  lactated ringers. 1000 milliLiter(s) IV Continuous <Continuous>    Genitourinary Medications    Hematologic/Oncologic Medications    Antimicrobial/Immunologic Medications    Endocrine/Metabolic Medications  insulin lispro (ADMELOG) corrective regimen sliding scale   SubCutaneous every 6 hours    Topical/Other Medications  chlorhexidine 4% Liquid 1 Application(s) Topical daily    --------------------------------------------------------------------------------------    VITAL SIGNS:  T(C): 37.1 (12-10-24 @ 10:50), Max: 37.1 (12-10-24 @ 10:50)  HR: 109 (12-10-24 @ 10:50) (109 - 109)  BP: 121/57 (12-10-24 @ 10:50) (121/57 - 121/57)  RR: 16 (12-10-24 @ 10:50) (16 - 16)  SpO2: 95% (12-10-24 @ 10:50) (95% - 95%)  --------------------------------------------------------------------------------------    INS AND OUTS:    --------------------------------------------------------------------------------------    EXAM    NEURO: Sedated on propofol and fentanyl, paralyzed  HEENT: NC/AT  RESPIRATORY: nonlabored respirations, normal CW expansion, intubated on Volume AC: 16/450/5/50  CARDIO: RRR, S1S2, hypertensive systolic 150s  ABDOMEN: soft, nontender, nondistended, incisions c/d/i  EXTREMITIES: normal strength, no deformities    --------------------------------------------------------------------------------------    LABS    CBC (12-10 @ 21:47)                          13.5                     12.82[H]  )--------------(  487[H]     --    % Neuts, --    % Lymphs, ANC: --                              39.7      BMP (12-10 @ 21:47)       139     |  100     |  9     			Ca++ --      Ca 9.4          ---------------------------------( 153[H]		Mg 1.70         3.4[L]  |  21[L]   |  0.81  			Ph 3.9         Coags (12-10 @ 21:47)  aPTT 24.8 / INR 1.08 / PT 12.9      ABG (12-10 @ 21:47)     7.45 / 37 / 110[H] / 26 / 1.8 / 98.9[H]%     Lactate:        Urinalysis (12-10 @ 21:47):     Color:  / Appearance:  / SG:  / pH:  / Gluc: 153[H] / Ketones:  / Bili:  / Urobili:  / Protein : / Nitrites:  / Leuk.Est:  / RBC:  / WBC:  / Sq Epi:  / Non Sq Epi:  / Bacteria      Urinalysis ( @ 09:43):     Color: Dark Yellow / Appearance: Turbid[!] / S.021 / pH: 6.5 / Gluc: Negative / Ketones: Trace[!] / Bili: Small[!] / Urobili: 2.0[!] / Protein :100[!] / Nitrites: Negative / Leuk.Est: Small[!] / RBC: 20[H] / WBC: 7[H] / Sq Epi:  / Non Sq Epi: 21[H] / Bacteria Many[!]       -> Clean Catch Clean Catch (Midstream) Culture ( @ 12:29)     NG    NG    >=3 organisms. Probable collection contamination.      --------------------------------------------------------------------------------------
CHIEF COMPLAINT:Patient is a 84y old  Female who presents with a chief complaint of     HISTORY OF PRESENT ILLNESS:  84 Female with history of HTN, HLD, known to me from office and pre op eval  with large ovarian / pelvic mass is now s/p RA TLH, BSO, Cysto, R inguinal hernia repair for ovarian cyst and right groin mass  on vent  ROS limited         PAST MEDICAL & SURGICAL HISTORY:  CAD (coronary artery disease)      HTN (hypertension)      Mass of uterine adnexa      Pericardial effusion      Osteoarthritis      Asthma      Chronic back pain      Transient ischemic attack (TIA)      S/P appendectomy      H/O endoscopy      Status post creation of pericardial window      S/P hip replacement, left              MEDICATIONS:  heparin   Injectable 5000 Unit(s) SubCutaneous every 8 hours        dexMEDEtomidine Infusion 0.05 MICROgram(s)/kG/Hr IV Continuous <Continuous>  fentaNYL   Infusion 2.5 MICROgram(s)/kG/Hr IV Continuous <Continuous>  propofol Infusion 50 MICROgram(s)/kG/Min IV Continuous <Continuous>        chlorhexidine 0.12% Liquid 15 milliLiter(s) Oral Mucosa every 12 hours  lactated ringers. 1000 milliLiter(s) IV Continuous <Continuous>      FAMILY HISTORY:      Non-contributory    SOCIAL HISTORY:    No tobacco, drugs or etoh    Allergies    No Known Allergies    Intolerances    	    REVIEW OF SYSTEMS:  as above  The rest of the 14 points ROS reviewed and except above they are unremarkable.        PHYSICAL EXAM:  T(C): 36.5 (12-11-24 @ 04:00), Max: 37.1 (12-10-24 @ 10:50)  HR: 101 (12-11-24 @ 06:45) (73 - 111)  BP: 99/57 (12-11-24 @ 06:45) (99/57 - 150/78)  RR: 14 (12-11-24 @ 06:45) (14 - 25)  SpO2: 99% (12-11-24 @ 06:45) (95% - 100%)  Wt(kg): --  I&O's Summary    10 Dec 2024 07:01  -  11 Dec 2024 07:00  --------------------------------------------------------  IN: 1333.4 mL / OUT: 875 mL / NET: 458.4 mL        Appearance: on vent 	  Cardiovascular: Normal S1 S2,    Murmur:   Neck: JVP limited to be evaluated   Respiratory: Lungs few rhonchi   Gastrointestinal:  Soft  Skin: normal   Neuro: limited as pt on vent      LABS/DATA:    TELEMETRY: 	  sinus, first degree AVB  ECG:  	   	  CARDIAC MARKERS:        < from: Nuclear Stress Test-Pharmacologic.. (12.02.24 @ 14:05) >  --------------------------------------------------------------------------------------------------------------------------------------------------------Conclusions:   1. Myocardial Perfusion: Mildly Abnormal.   2. Qualitative Perfusion:      - small-sized, mild defect(s) in the basal anterolateral and basal inferolateral walls that are fixed suggestive of an infarct.   3. The left ventricle is normal in function and normal in size. The post stress left ventricular EF is 73 %. The stress end diastolic volume is 63 ml and systolic volume is 17 ml.   4. Hypokinesis of the basal anterolateral, basal inferolateral wall.    < end of copied text >  < from: TTE W or WO Ultrasound Enhancing Agent (12.02.24 @ 10:08) >  CONCLUSIONS:      1. Left ventricular cavity is normal in size. Left ventricular wall thickness is normal. Left ventricular systolic function is hyperdynamic with an ejection fraction of 76 % by Cosme's method of disks. There are no regional wall motion abnormalities seen.   2. Mild left ventricular hypertrophy.   3. Normal left ventricular diastolic function, with normal left ventricular filling pressure.   4. The right ventricle is not well visualized. Normal right ventricular cavity size, with normal wall thickness, and probably normal right ventricular systolic function.   5. Mild mitral valve leaflet calcification.   6. There is calcification of the aortic valve leaflets.   7. There is mild calcification of the mitral valve annulus.    < end of copied text >                                12.6   12.21 )-----------( 413      ( 11 Dec 2024 01:00 )             37.0     12-11    138  |  100  |  10  ----------------------------<  160[H]  3.1[L]   |  20[L]  |  0.80    Ca    8.9      11 Dec 2024 01:00  Phos  3.7     12-11  Mg     1.90     12-11      proBNP:   Lipid Profile:   HgA1c:   TSH:

## 2024-12-11 NOTE — DISCHARGE NOTE PROVIDER - NSDCCPTREATMENT_GEN_ALL_CORE_FT
PRINCIPAL PROCEDURE  Procedure: Robot-assisted laparoscopic total hysterectomy with bilateral salpingo-oophorectomy (BSO) and cystoscopy using da Mumtaz Xi  Findings and Treatment:

## 2024-12-11 NOTE — DIETITIAN INITIAL EVALUATION ADULT - OTHER INFO
Per chart, pt is 84 year old female PMH HTN, TIA, pericardial effusion s/p drain, uterine mass now s/p robot assisted laparoscopic total hysterectomy with BSO and cystoscopy, right inguinal hernia repair (12/10) admitted to SICU with concern for laryngeal edema in setting of prolonged Trendelenburg positioning (7 hours). [facility], Surgery and Cardiology on board.   Per chart, pt is 84 year old female PMH HTN, TIA, pericardial effusion s/p drain, uterine mass now s/p robot assisted laparoscopic total hysterectomy with BSO and cystoscopy, right inguinal hernia repair (12/10) admitted to SICU with concern for laryngeal edema in setting of prolonged Trendelenburg positioning (7 hours). [facility], Surgery and Cardiology on board.    Pt remains intubated, possible plan to extubate today; comprehensive chart review completed. NKFA, no noted chewing/swallowing difficulties. Pt lives at home with daughter. Noted with recent hospitalization x2 (11/2024).     Pt remains NPO since admission (1 day). Labs notable for hypokalemia, hyperglycemia. No current GI distress.

## 2024-12-11 NOTE — PHYSICAL THERAPY INITIAL EVALUATION ADULT - PERTINENT HX OF CURRENT PROBLEM, REHAB EVAL
Pt is an 84 year old woman with a history of hypertension, a prior transient cerebrovascular ischemic episode, pericardial effusion requiring prior drainage, and a uterine mass now seen following robotic-assisted laparoscopic total hysterectomy and bilateral salpingo-oophorectomy with general surgery consulted intra-operatively for repair of an indirect right inguinal hernia with an adherent cyst; robotic-assisted transabdominal preperitoneal mesh repair was performed (Dr. Lemus, 12/10/2024). She is recovering well in the SICU,

## 2024-12-11 NOTE — CONSULT NOTE ADULT - ASSESSMENT
First degree AVB  asymptomatic  avoid av kiya blockers    HTN  off meds for now  on vent    Resp failure on vent   wean off vent  as per ICU    
ASSESSMENT:  84 year old female with a past medical history significant for hypertension, TIA, pericardial effusion s/p drain, and uterine mass is now s/p a robot assisted laparoscopic total hysterectomy with bilateral salpingo-oophorectomy (BSO) and cystoscopy with Dr. Troy as well as a right inguinal hernia repair with Dr. Lemus. SICU was consulted due to concerns for laryngeal edema i/s/o prolonged trendelenberg positioning (7 hours). SICU to manage mechanical ventilation and perform delayed extubation when there is no  longer a concern for laryngeal edema.    NEUROLOGIC   - Pain control: fentanyl, IV tylenol  - Sedation: propofol    RESPIRATORY   - Monitor SpO2 goal >92%  - Mechanical ventilation volume AC: 16/450/5/50, wean to extubate    CARDIOVASCULAR   - Monitor hemodynamics   - MAP goal > 65    GASTROINTESTINAL   - Diet: NPO    /RENAL   - IV fluids: LR @ 100cc/hr  - Maintain del rio catheter, strict Is/Os  - Monitor electrolytes, replete PRN    HEMATOLOGIC  - Monitor H/H   - DVT ppx: SQH & SCD's    INFECTIOUS DISEASE  - Monitor fever / WBC    ENDOCRINE  - Monitor glucose     LINES  - A line   - [place]   - PIV     DISPO: SICU

## 2024-12-11 NOTE — DISCHARGE NOTE PROVIDER - HOSPITAL COURSE
Patient is a 83 yo with PMHx of HTN, TIA, 1st degree AV block, and previous pericardial effusion s/p drain who presented for RA TLH, BSO, Cysto, R inguinal hernia repair for ovarian cyst and right groin mass (frozen of cyst=benign). Please see operative note for full details. Following her procedure, pt was admitted to SICU because of delayed extubation.   EBL: 200cc. Hct: 37.0     On POD#1, patient was alert and was hemodynamically stable with appropriate UOP. ***She tolerated extubation without desaturation or shortness of breath. Cardiology recommended avoidance of AV kiya blockers.        POD#2 routine post-operative care was performed.  No notable events.   On POD#3 patient was deemed stable for discharge as she was meeting postoperative milestones - tolerating regular diet, ambulating without difficulty, voiding, and pain was well controlled on oral medications. Throughout admission, patient received prophylactic anticoagulation.  Patient was instructed to follow up with Dr. Troy in 2 weeks. Patient is a 83 yo with PMHx of HTN, TIA, 1st degree AV block, and previous pericardial effusion s/p drain who presented for RA TLH, BSO, Cysto, R inguinal hernia repair for ovarian cyst and right groin mass (frozen of cyst=benign). Please see operative note for full details. Following her procedure, pt was admitted to SICU because of delayed extubation.   EBL: 200cc. Hct: 37.0     On POD#1, patient was alert and was hemodynamically stable with appropriate UOP. She was extubated in the SICU and weaned to 2L NC. Cardiology recommended avoidance of AV kiya blockers. She was downgraded from SICU to 4T.    On POD#2, patient was maintaining her oxygen saturation on room air. She was deemed stable for discharge as she was meeting postoperative milestones - tolerating regular diet, ambulating without difficulty, voiding, and pain was well controlled on oral medications. Throughout admission, patient received prophylactic anticoagulation.  Patient was instructed to follow up with Dr. Troy in 2 weeks. Patient is a 85 yo with PMHx of HTN, TIA, 1st degree AV block, and previous pericardial effusion s/p drain who presented for RA TLH, BSO, Cysto, R inguinal hernia repair for ovarian cyst and right groin mass (frozen of cyst=benign). Please see operative note for full details. Following her procedure, pt was admitted to SICU because of delayed extubation.   EBL: 200cc. Hct: 37.0     On POD#1, patient was alert and was hemodynamically stable with appropriate UOP. She was extubated in the SICU and weaned to 2L NC. Cardiology recommended avoidance of AV kiya blockers. She was downgraded from SICU to 4T.    On POD#2, patient was maintaining her oxygen saturation on room air. She was deemed stable for discharge as she was meeting postoperative milestones - tolerating regular diet, ambulating without difficulty, voiding, and pain was well controlled on oral medications. Throughout admission, patient received prophylactic anticoagulation.    On POD #3 patient was passing flatus and was tolerating pain and PO. Was discharged to [facility] with appropriate return precautions.     Patient was instructed to follow up with Dr. Troy in 2 weeks.

## 2024-12-11 NOTE — PHYSICAL THERAPY INITIAL EVALUATION ADULT - ADDITIONAL COMMENTS
Pt lives with her daughter in a house with 4 steps to enter. Pt uses a rolling walker and requires assistance with ADLs. Pt reports 1 fall in the past 6 months.  Pt left semi supine in bed in NAD, all lines intact, call kate in reach and JEB Ceja made aware.

## 2024-12-11 NOTE — DIETITIAN INITIAL EVALUATION ADULT - ADD RECOMMEND
Consider alternate means of nutrition, RDN remains available for consult.  Monitor electrolytes (K+, Mg, P) and replete to within desired limits as clinically indicated.

## 2024-12-12 LAB
ANION GAP SERPL CALC-SCNC: 11 MMOL/L — SIGNIFICANT CHANGE UP (ref 7–14)
BLD GP AB SCN SERPL QL: POSITIVE — SIGNIFICANT CHANGE UP
BUN SERPL-MCNC: 18 MG/DL — SIGNIFICANT CHANGE UP (ref 7–23)
CALCIUM SERPL-MCNC: 8.6 MG/DL — SIGNIFICANT CHANGE UP (ref 8.4–10.5)
CHLORIDE SERPL-SCNC: 100 MMOL/L — SIGNIFICANT CHANGE UP (ref 98–107)
CO2 SERPL-SCNC: 25 MMOL/L — SIGNIFICANT CHANGE UP (ref 22–31)
CREAT SERPL-MCNC: 1.2 MG/DL — SIGNIFICANT CHANGE UP (ref 0.5–1.3)
EGFR: 45 ML/MIN/1.73M2 — LOW
GLUCOSE SERPL-MCNC: 80 MG/DL — SIGNIFICANT CHANGE UP (ref 70–99)
HCT VFR BLD CALC: 31.5 % — LOW (ref 34.5–45)
HGB BLD-MCNC: 10.4 G/DL — LOW (ref 11.5–15.5)
MAGNESIUM SERPL-MCNC: 2.2 MG/DL — SIGNIFICANT CHANGE UP (ref 1.6–2.6)
MCHC RBC-ENTMCNC: 29.1 PG — SIGNIFICANT CHANGE UP (ref 27–34)
MCHC RBC-ENTMCNC: 33 G/DL — SIGNIFICANT CHANGE UP (ref 32–36)
MCV RBC AUTO: 88.2 FL — SIGNIFICANT CHANGE UP (ref 80–100)
MRSA PCR RESULT.: SIGNIFICANT CHANGE UP
NRBC # BLD: 0 /100 WBCS — SIGNIFICANT CHANGE UP (ref 0–0)
NRBC # FLD: 0 K/UL — SIGNIFICANT CHANGE UP (ref 0–0)
PHOSPHATE SERPL-MCNC: 4.4 MG/DL — SIGNIFICANT CHANGE UP (ref 2.5–4.5)
PLATELET # BLD AUTO: 441 K/UL — HIGH (ref 150–400)
POTASSIUM SERPL-MCNC: 3.8 MMOL/L — SIGNIFICANT CHANGE UP (ref 3.5–5.3)
POTASSIUM SERPL-SCNC: 3.8 MMOL/L — SIGNIFICANT CHANGE UP (ref 3.5–5.3)
RBC # BLD: 3.57 M/UL — LOW (ref 3.8–5.2)
RBC # FLD: 13.5 % — SIGNIFICANT CHANGE UP (ref 10.3–14.5)
RH IG SCN BLD-IMP: NEGATIVE — SIGNIFICANT CHANGE UP
S AUREUS DNA NOSE QL NAA+PROBE: SIGNIFICANT CHANGE UP
SODIUM SERPL-SCNC: 136 MMOL/L — SIGNIFICANT CHANGE UP (ref 135–145)
WBC # BLD: 11.49 K/UL — HIGH (ref 3.8–10.5)
WBC # FLD AUTO: 11.49 K/UL — HIGH (ref 3.8–10.5)

## 2024-12-12 RX ORDER — OXYCODONE HYDROCHLORIDE 30 MG/1
1 TABLET ORAL
Qty: 10 | Refills: 0
Start: 2024-12-12

## 2024-12-12 RX ORDER — POTASSIUM CHLORIDE 600 MG/1
20 TABLET, EXTENDED RELEASE ORAL ONCE
Refills: 0 | Status: DISCONTINUED | OUTPATIENT
Start: 2024-12-12 | End: 2024-12-12

## 2024-12-12 RX ORDER — ACETAMINOPHEN 500MG 500 MG/1
3 TABLET, COATED ORAL
Qty: 0 | Refills: 0 | DISCHARGE
Start: 2024-12-12

## 2024-12-12 RX ORDER — IBUPROFEN 200 MG
600 TABLET ORAL EVERY 6 HOURS
Refills: 0 | Status: DISCONTINUED | OUTPATIENT
Start: 2024-12-12 | End: 2024-12-12

## 2024-12-12 RX ORDER — SIMETHICONE 125 MG
80 CAPSULE ORAL EVERY 6 HOURS
Refills: 0 | Status: DISCONTINUED | OUTPATIENT
Start: 2024-12-12 | End: 2024-12-13

## 2024-12-12 RX ORDER — POTASSIUM CHLORIDE 600 MG/1
20 TABLET, EXTENDED RELEASE ORAL ONCE
Refills: 0 | Status: COMPLETED | OUTPATIENT
Start: 2024-12-12 | End: 2024-12-12

## 2024-12-12 RX ORDER — IBUPROFEN 200 MG
1 TABLET ORAL
Qty: 0 | Refills: 0 | DISCHARGE

## 2024-12-12 RX ORDER — OXYCODONE HYDROCHLORIDE 30 MG/1
1 TABLET ORAL
Refills: 0 | DISCHARGE

## 2024-12-12 RX ORDER — OXYCODONE HYDROCHLORIDE 30 MG/1
2.5 TABLET ORAL ONCE
Refills: 0 | Status: DISCONTINUED | OUTPATIENT
Start: 2024-12-12 | End: 2024-12-12

## 2024-12-12 RX ADMIN — OXYCODONE HYDROCHLORIDE 2.5 MILLIGRAM(S): 30 TABLET ORAL at 05:44

## 2024-12-12 RX ADMIN — ACETAMINOPHEN 500MG 975 MILLIGRAM(S): 500 TABLET, COATED ORAL at 23:57

## 2024-12-12 RX ADMIN — OXYCODONE HYDROCHLORIDE 5 MILLIGRAM(S): 30 TABLET ORAL at 10:15

## 2024-12-12 RX ADMIN — ACETAMINOPHEN 500MG 975 MILLIGRAM(S): 500 TABLET, COATED ORAL at 18:45

## 2024-12-12 RX ADMIN — OXYCODONE HYDROCHLORIDE 5 MILLIGRAM(S): 30 TABLET ORAL at 03:37

## 2024-12-12 RX ADMIN — POLYETHYLENE GLYCOL 3350 17 GRAM(S): 17 POWDER, FOR SOLUTION ORAL at 11:55

## 2024-12-12 RX ADMIN — ACETAMINOPHEN 500MG 975 MILLIGRAM(S): 500 TABLET, COATED ORAL at 00:23

## 2024-12-12 RX ADMIN — ACETAMINOPHEN 500MG 975 MILLIGRAM(S): 500 TABLET, COATED ORAL at 18:12

## 2024-12-12 RX ADMIN — ACETAMINOPHEN 500MG 975 MILLIGRAM(S): 500 TABLET, COATED ORAL at 06:44

## 2024-12-12 RX ADMIN — ACETAMINOPHEN 500MG 975 MILLIGRAM(S): 500 TABLET, COATED ORAL at 12:30

## 2024-12-12 RX ADMIN — OXYCODONE HYDROCHLORIDE 5 MILLIGRAM(S): 30 TABLET ORAL at 02:37

## 2024-12-12 RX ADMIN — OXYCODONE HYDROCHLORIDE 5 MILLIGRAM(S): 30 TABLET ORAL at 15:48

## 2024-12-12 RX ADMIN — Medication 5000 UNIT(S): at 05:44

## 2024-12-12 RX ADMIN — ACETAMINOPHEN 500MG 975 MILLIGRAM(S): 500 TABLET, COATED ORAL at 05:44

## 2024-12-12 RX ADMIN — OXYCODONE HYDROCHLORIDE 5 MILLIGRAM(S): 30 TABLET ORAL at 16:25

## 2024-12-12 RX ADMIN — Medication 5000 UNIT(S): at 14:23

## 2024-12-12 RX ADMIN — OXYCODONE HYDROCHLORIDE 2.5 MILLIGRAM(S): 30 TABLET ORAL at 06:44

## 2024-12-12 RX ADMIN — POTASSIUM CHLORIDE 20 MILLIEQUIVALENT(S): 600 TABLET, EXTENDED RELEASE ORAL at 10:58

## 2024-12-12 RX ADMIN — OXYCODONE HYDROCHLORIDE 5 MILLIGRAM(S): 30 TABLET ORAL at 21:15

## 2024-12-12 RX ADMIN — OXYCODONE HYDROCHLORIDE 5 MILLIGRAM(S): 30 TABLET ORAL at 09:41

## 2024-12-12 RX ADMIN — OXYCODONE HYDROCHLORIDE 5 MILLIGRAM(S): 30 TABLET ORAL at 22:15

## 2024-12-12 RX ADMIN — ACETAMINOPHEN 500MG 975 MILLIGRAM(S): 500 TABLET, COATED ORAL at 11:54

## 2024-12-12 RX ADMIN — Medication 5000 UNIT(S): at 21:09

## 2024-12-13 ENCOUNTER — TRANSCRIPTION ENCOUNTER (OUTPATIENT)
Age: 84
End: 2024-12-13

## 2024-12-13 VITALS
OXYGEN SATURATION: 96 % | RESPIRATION RATE: 19 BRPM | DIASTOLIC BLOOD PRESSURE: 63 MMHG | TEMPERATURE: 98 F | SYSTOLIC BLOOD PRESSURE: 129 MMHG | HEART RATE: 92 BPM

## 2024-12-13 LAB
ANION GAP SERPL CALC-SCNC: 10 MMOL/L — SIGNIFICANT CHANGE UP (ref 7–14)
BASOPHILS # BLD AUTO: 0.03 K/UL — SIGNIFICANT CHANGE UP (ref 0–0.2)
BASOPHILS NFR BLD AUTO: 0.3 % — SIGNIFICANT CHANGE UP (ref 0–2)
BUN SERPL-MCNC: 18 MG/DL — SIGNIFICANT CHANGE UP (ref 7–23)
CALCIUM SERPL-MCNC: 8.7 MG/DL — SIGNIFICANT CHANGE UP (ref 8.4–10.5)
CHLORIDE SERPL-SCNC: 104 MMOL/L — SIGNIFICANT CHANGE UP (ref 98–107)
CO2 SERPL-SCNC: 26 MMOL/L — SIGNIFICANT CHANGE UP (ref 22–31)
CREAT SERPL-MCNC: 1.08 MG/DL — SIGNIFICANT CHANGE UP (ref 0.5–1.3)
EGFR: 51 ML/MIN/1.73M2 — LOW
EOSINOPHIL # BLD AUTO: 0.18 K/UL — SIGNIFICANT CHANGE UP (ref 0–0.5)
EOSINOPHIL NFR BLD AUTO: 2.1 % — SIGNIFICANT CHANGE UP (ref 0–6)
GLUCOSE SERPL-MCNC: 74 MG/DL — SIGNIFICANT CHANGE UP (ref 70–99)
HCT VFR BLD CALC: 31.8 % — LOW (ref 34.5–45)
HGB BLD-MCNC: 10.2 G/DL — LOW (ref 11.5–15.5)
IANC: 5.79 K/UL — SIGNIFICANT CHANGE UP (ref 1.8–7.4)
IMM GRANULOCYTES NFR BLD AUTO: 0.5 % — SIGNIFICANT CHANGE UP (ref 0–0.9)
LYMPHOCYTES # BLD AUTO: 1.75 K/UL — SIGNIFICANT CHANGE UP (ref 1–3.3)
LYMPHOCYTES # BLD AUTO: 20 % — SIGNIFICANT CHANGE UP (ref 13–44)
MAGNESIUM SERPL-MCNC: 2.3 MG/DL — SIGNIFICANT CHANGE UP (ref 1.6–2.6)
MCHC RBC-ENTMCNC: 28.7 PG — SIGNIFICANT CHANGE UP (ref 27–34)
MCHC RBC-ENTMCNC: 32.1 G/DL — SIGNIFICANT CHANGE UP (ref 32–36)
MCV RBC AUTO: 89.6 FL — SIGNIFICANT CHANGE UP (ref 80–100)
MONOCYTES # BLD AUTO: 0.98 K/UL — HIGH (ref 0–0.9)
MONOCYTES NFR BLD AUTO: 11.2 % — SIGNIFICANT CHANGE UP (ref 2–14)
NEUTROPHILS # BLD AUTO: 5.79 K/UL — SIGNIFICANT CHANGE UP (ref 1.8–7.4)
NEUTROPHILS NFR BLD AUTO: 65.9 % — SIGNIFICANT CHANGE UP (ref 43–77)
NRBC # BLD: 0 /100 WBCS — SIGNIFICANT CHANGE UP (ref 0–0)
NRBC # FLD: 0 K/UL — SIGNIFICANT CHANGE UP (ref 0–0)
PHOSPHATE SERPL-MCNC: 3 MG/DL — SIGNIFICANT CHANGE UP (ref 2.5–4.5)
PLATELET # BLD AUTO: 418 K/UL — HIGH (ref 150–400)
POTASSIUM SERPL-MCNC: 3.7 MMOL/L — SIGNIFICANT CHANGE UP (ref 3.5–5.3)
POTASSIUM SERPL-SCNC: 3.7 MMOL/L — SIGNIFICANT CHANGE UP (ref 3.5–5.3)
RBC # BLD: 3.55 M/UL — LOW (ref 3.8–5.2)
RBC # FLD: 13.6 % — SIGNIFICANT CHANGE UP (ref 10.3–14.5)
SODIUM SERPL-SCNC: 140 MMOL/L — SIGNIFICANT CHANGE UP (ref 135–145)
WBC # BLD: 8.77 K/UL — SIGNIFICANT CHANGE UP (ref 3.8–10.5)
WBC # FLD AUTO: 8.77 K/UL — SIGNIFICANT CHANGE UP (ref 3.8–10.5)

## 2024-12-13 RX ORDER — POTASSIUM CHLORIDE 600 MG/1
40 TABLET, EXTENDED RELEASE ORAL ONCE
Refills: 0 | Status: COMPLETED | OUTPATIENT
Start: 2024-12-13 | End: 2024-12-13

## 2024-12-13 RX ADMIN — OXYCODONE HYDROCHLORIDE 5 MILLIGRAM(S): 30 TABLET ORAL at 03:12

## 2024-12-13 RX ADMIN — ACETAMINOPHEN 500MG 975 MILLIGRAM(S): 500 TABLET, COATED ORAL at 00:57

## 2024-12-13 RX ADMIN — ACETAMINOPHEN 500MG 975 MILLIGRAM(S): 500 TABLET, COATED ORAL at 05:34

## 2024-12-13 RX ADMIN — ACETAMINOPHEN 500MG 975 MILLIGRAM(S): 500 TABLET, COATED ORAL at 13:54

## 2024-12-13 RX ADMIN — POLYETHYLENE GLYCOL 3350 17 GRAM(S): 17 POWDER, FOR SOLUTION ORAL at 11:42

## 2024-12-13 RX ADMIN — ACETAMINOPHEN 500MG 975 MILLIGRAM(S): 500 TABLET, COATED ORAL at 06:34

## 2024-12-13 RX ADMIN — POTASSIUM CHLORIDE 40 MILLIEQUIVALENT(S): 600 TABLET, EXTENDED RELEASE ORAL at 10:37

## 2024-12-13 RX ADMIN — OXYCODONE HYDROCHLORIDE 5 MILLIGRAM(S): 30 TABLET ORAL at 04:12

## 2024-12-13 RX ADMIN — Medication 5000 UNIT(S): at 05:34

## 2024-12-13 RX ADMIN — OXYCODONE HYDROCHLORIDE 5 MILLIGRAM(S): 30 TABLET ORAL at 11:41

## 2024-12-13 NOTE — DISCHARGE NOTE NURSING/CASE MANAGEMENT/SOCIAL WORK - FINANCIAL ASSISTANCE
[facility] provides services at a reduced cost to those who are determined to be eligible through [facility]’s financial assistance program. Information regarding [facility]’s financial assistance program can be found by going to [email or web address] or by calling [phone].

## 2024-12-13 NOTE — DISCHARGE NOTE NURSING/CASE MANAGEMENT/SOCIAL WORK - NSDCPEFALRISK_GEN_ALL_CORE
For information on Fall & Injury Prevention, visit: [email or web address] OR  [email or web address] OR  https://www.cdc.gov/steadi/patient.html

## 2024-12-13 NOTE — DISCHARGE NOTE NURSING/CASE MANAGEMENT/SOCIAL WORK - PATIENT PORTAL LINK FT
You can access the FollowMyHealth Patient Portal offered by [facility] by registering at the following website: [email or web address]. By joining [employer]’s FollowMyHealth portal, you will also be able to view your health information using other applications (apps) compatible with our system.

## 2024-12-13 NOTE — PROGRESS NOTE ADULT - NSPROGADDITIONALINFOA_GEN_ALL_CORE
Fellow addendum    Pain well controlled  H/H downtrending, VSS. Low concern for ongoing bleeding  Tolerating reg diet, not yet passing flatus. LSC incisions c/d/i, covered with op sites  UOP adequate, plan for TOV  Continue   PT eval  Encouraged ISS  DVT ppx: lovenox, SCDs    Dispo: cont routine postop care    Amanda Whalen MD
fair balance
Fellow addendum    POD#3 s/p RA-TLH/BSO/right inguinal hernia repair  Meeting postop milestones  Stable for discharge to [facility]    Amanda Whalen MD

## 2024-12-13 NOTE — DISCHARGE NOTE NURSING/CASE MANAGEMENT/SOCIAL WORK - NSDCPNINST_GEN_ALL_CORE
notify md if having fever ,nausea ,or vomiting ,if incision site looks red swollen or discharge noted

## 2024-12-13 NOTE — PROGRESS NOTE ADULT - SUBJECTIVE AND OBJECTIVE BOX
Subjective: Patient seen and examined. No new events except as noted.     SUBJECTIVE/ROS:  No chest pain, dyspnea, palpitation, or dizziness.       MEDICATIONS:  MEDICATIONS  (STANDING):  acetaminophen     Tablet .. 975 milliGRAM(s) Oral every 6 hours  heparin   Injectable 5000 Unit(s) SubCutaneous every 8 hours  lactated ringers. 1000 milliLiter(s) (75 mL/Hr) IV Continuous <Continuous>  polyethylene glycol 3350 17 Gram(s) Oral daily      PHYSICAL EXAM:  T(C): 36.6 (12-12-24 @ 04:40), Max: 37 (12-11-24 @ 16:00)  HR: 87 (12-12-24 @ 04:40) (86 - 120)  BP: 121/71 (12-12-24 @ 04:40) (103/61 - 148/74)  RR: 16 (12-12-24 @ 04:40) (16 - 25)  SpO2: 94% (12-12-24 @ 04:40) (94% - 99%)  Wt(kg): --  I&O's Summary    11 Dec 2024 07:01  -  12 Dec 2024 07:00  --------------------------------------------------------  IN: 3320.1 mL / OUT: 1230 mL / NET: 2090.1 mL            JVP: Normal  Neck: supple  Lung: clear   CV: S1 S2 , Murmur:  Abd: soft  Ext: No edema  neuro: Awake / alert  Psych: flat affect  Skin: normal``    LABS/DATA:    CARDIAC MARKERS:                                10.4   11.49 )-----------( 441      ( 12 Dec 2024 04:00 )             31.5     12-12    136  |  100  |  18  ----------------------------<  80  3.8   |  25  |  1.20    Ca    8.6      12 Dec 2024 04:00  Phos  4.4     12-12  Mg     2.20     12-12      proBNP:   Lipid Profile:   HgA1c:   TSH:     TELE:  EKG:        
    Subjective: Patient seen and examined. No new events except as noted.     SUBJECTIVE/ROS:  nad      MEDICATIONS:  MEDICATIONS  (STANDING):  acetaminophen     Tablet .. 975 milliGRAM(s) Oral every 6 hours  heparin   Injectable 5000 Unit(s) SubCutaneous every 8 hours  polyethylene glycol 3350 17 Gram(s) Oral daily  potassium chloride    Tablet ER 40 milliEquivalent(s) Oral once      PHYSICAL EXAM:  T(C): 37 (12-13-24 @ 08:39), Max: 37 (12-12-24 @ 19:51)  HR: 100 (12-13-24 @ 08:39) (75 - 102)  BP: 137/82 (12-13-24 @ 08:39) (122/58 - 137/82)  RR: 18 (12-13-24 @ 08:39) (17 - 19)  SpO2: 96% (12-13-24 @ 08:39) (94% - 98%)  Wt(kg): --  I&O's Summary    12 Dec 2024 07:01  -  13 Dec 2024 07:00  --------------------------------------------------------  IN: 1640 mL / OUT: 2700 mL / NET: -1060 mL    13 Dec 2024 07:01  -  13 Dec 2024 09:56  --------------------------------------------------------  IN: 300 mL / OUT: 75 mL / NET: 225 mL            JVP: Normal  Neck: supple  Lung: clear   CV: S1 S2 , Murmur:  Abd: soft  Ext: No edema  neuro: Awake / alert  Psych: flat affect  Skin: normal``    LABS/DATA:    CARDIAC MARKERS:                                10.2   8.77  )-----------( 418      ( 13 Dec 2024 04:30 )             31.8     12-13    140  |  104  |  18  ----------------------------<  74  3.7   |  26  |  1.08    Ca    8.7      13 Dec 2024 04:30  Phos  3.0     12-13  Mg     2.30     12-13      proBNP:   Lipid Profile:   HgA1c:   TSH:     TELE:  EKG:        
General Surgery Progress Note    Subjective and Interval  No acute events overnight. Denies nausea, vomiting, or worsening abdominal pain.   ___________________________________________________  Vital Signs  T(C): 36.6 (08:00), Max: 37 (16:00)  HR: 83 (08:00) (83 - 113)  BP: 150/68 (08:00) (103/61 - 150/68)  ABP: 115/79 (11:30) (115/79 - 140/99)  ABP(mean): 93 (11:30) (93 - 109)  RR: 18 (08:00) (16 - 25)  SpO2: 94% (08:00) (94% - 98%)    Physical Exam  General: Resting comfortably in no acute distress.   Cardiac: Warm and well-perfused.   Respiratory: Equal chest wall expansion bilaterally with no accessory muscle use, no tachypnea, and no grossly increased work of breathing on room air.   Abdomen: Soft, nontender and nondistended. No rebound tenderness or guarding is elicited. Dressings clean, dry and intact. No palpable right inguinal masses.   ___________________________________________________  Laboratory studies  CBC Basic (12-12 @ 04:00)  WBC: 11.49 Hgb: 10.4 Hct: 31.5 Plt: 441    Metabolic Panel (12-12 @ 04:00)  136  |  100  |  18  ----------------------------<  80  3.8   |  25  |  1.20  Ca: 8.6/Phos: 4.4/Magnesium: 2.20  
Gyn ONC Progress Note POD#1    Subjective:   Patient seen and examined at bedside. Patient remains intubated but is alert. Patient given pen and paper and asking when she will go home and if her daughter is aware of how she is doing. Discussed with patient that the plan is to extubate this morning and her family member is aware. After coughing fit patient pointed to abdomen an expressed pain. Holliday is still in place.    Objective:  T(F): 97.7 (12-11-24 @ 04:00), Max: 98.8 (12-10-24 @ 10:50)  HR: 101 (12-11-24 @ 06:45) (73 - 111)  BP: 99/57 (12-11-24 @ 06:45) (99/57 - 150/78)  RR: 14 (12-11-24 @ 06:45) (14 - 25)  SpO2: 99% (12-11-24 @ 06:45) (95% - 100%)  Wt(kg): --  I&O's Summary    10 Dec 2024 07:01  -  11 Dec 2024 07:00  --------------------------------------------------------  IN: 1333.4 mL / OUT: 875 mL / NET: 458.4 mL      CAPILLARY BLOOD GLUCOSE      POCT Blood Glucose.: 112 mg/dL (10 Dec 2024 10:46)      MEDICATIONS  (STANDING):  chlorhexidine 0.12% Liquid 15 milliLiter(s) Oral Mucosa every 12 hours  dexMEDEtomidine Infusion 0.05 MICROgram(s)/kG/Hr (1.23 mL/Hr) IV Continuous <Continuous>  fentaNYL   Infusion 2.5 MICROgram(s)/kG/Hr (24.6 mL/Hr) IV Continuous <Continuous>  heparin   Injectable 5000 Unit(s) SubCutaneous every 8 hours  lactated ringers. 1000 milliLiter(s) (100 mL/Hr) IV Continuous <Continuous>  propofol Infusion 50 MICROgram(s)/kG/Min (29.5 mL/Hr) IV Continuous <Continuous>    MEDICATIONS  (PRN):      Physical Exam:  Constitutional: NAD, A+O x3  CV: RRR  Lungs: clear to auscultation bilaterally  Abdomen: soft, appropriately tender, nondistended, no guarding, no rebound, normal bowel sounds  Incision: 5 LSC incisions c/d/i  Extremities: no lower extremity edema or calf tenderness bilaterally; venodynes in place    LABS:    12-11    138    |  100    |  10     ----------------------------<  160[H]  3.1[L]   |  20[L]  |  0.80   12-10    139    |  100    |  9      ----------------------------<  153[H]  3.4[L]   |  21[L]  |  0.81     Ca    8.9        11 Dec 2024 01:00  Ca    9.4        10 Dec 2024 21:47  Phos  3.7       12-11  Phos  3.9       12-10  Mg     1.90      12-11  Mg     1.70      12-10          PT/INR - ( 10 Dec 2024 21:47 )   PT: 12.9 sec;   INR: 1.08 ratio         PTT - ( 10 Dec 2024 21:47 )  PTT:24.8 sec  Urinalysis Basic - ( 11 Dec 2024 01:00 )    Color: x / Appearance: x / SG: x / pH: x  Gluc: 160 mg/dL / Ketone: x  / Bili: x / Urobili: x   Blood: x / Protein: x / Nitrite: x   Leuk Esterase: x / RBC: x / WBC x   Sq Epi: x / Non Sq Epi: x / Bacteria: x      UOP: 875 cc/ 12 hrs  
SICU Daily Progress Note  =====================================================  Interval/Overnight Events:       - NAEO    ALLERGIES:  No Known Allergies      --------------------------------------------------------------------------------------    MEDICATIONS:    Neurologic Medications  fentaNYL   Infusion 2.5 MICROgram(s)/kG/Hr IV Continuous <Continuous>  propofol Infusion 50 MICROgram(s)/kG/Min IV Continuous <Continuous>    Respiratory Medications    Cardiovascular Medications    Gastrointestinal Medications  lactated ringers. 1000 milliLiter(s) IV Continuous <Continuous>  potassium chloride  10 mEq/100 mL IVPB 10 milliEquivalent(s) IV Intermittent every 1 hour    Genitourinary Medications    Hematologic/Oncologic Medications  heparin   Injectable 5000 Unit(s) SubCutaneous every 8 hours    Antimicrobial/Immunologic Medications    Endocrine/Metabolic Medications    Topical/Other Medications  chlorhexidine 0.12% Liquid 15 milliLiter(s) Oral Mucosa every 12 hours    --------------------------------------------------------------------------------------    VITAL SIGNS:  T(C): 36.1 (12-11-24 @ 00:00), Max: 37.1 (12-10-24 @ 10:50)  HR: 89 (12-11-24 @ 01:15) (73 - 109)  BP: 127/74 (12-10-24 @ 23:00) (121/57 - 150/78)  RR: 16 (12-11-24 @ 01:15) (16 - 25)  SpO2: 100% (12-11-24 @ 01:15) (95% - 100%)  --------------------------------------------------------------------------------------    INS AND OUTS:    12-10-24 @ 07:01  -  12-11-24 @ 01:23  --------------------------------------------------------  IN: 494.6 mL / OUT: 600 mL / NET: -105.4 mL      --------------------------------------------------------------------------------------    EXAM    NEURO: Sedated on propofol and fentanyl  HEENT: NC/AT  RESPIRATORY: nonlabored respirations, normal CW expansion, intubated on Volume AC: 16/450/5/50  CARDIO: RRR, S1S2  ABDOMEN: soft, nontender, nondistended, incisions c/d/i  EXTREMITIES: normal strength, no deformities      --------------------------------------------------------------------------------------    LABS    PENDING  --------------------------------------------------------------------------------------
General Surgery Progress Note    Subjective and Interval  No acute events overnight. Stable in the SICU, remains intubated due to prolonged intra-operative Trendelenburg positioning, but is awake and alert. Pain well-controlled.   ___________________________________________________  Vital Signs  T(C): 36.5 (04:00), Max: 37.1 (10:50)  HR: 122 (08:08) (73 - 122)  BP: 140/72 (07:05) (99/57 - 150/78)  ABP: 114/97 (07:05) (86/76 - 167/100)  ABP(mean): 102 (07:05) (76 - 126)  RR: 27 (07:05) (14 - 27)  SpO2: 100% (08:08) (95% - 100%)    Physical Exam  General: Resting comfortably in no acute distress.   Cardiac: Warm and well-perfused.   Respiratory: Intubated with minimal settings.   Abdomen: Soft, nontender and nondistended. No rebound tenderness or guarding is elicited. Robotic port incisions clean, dry and intact. Right groin soft, no palpable hernia.   ___________________________________________________  Laboratory studies  CBC Basic (12-11 @ 01:00)  WBC: 12.21 Hgb: 12.6 Hct: 37.0 Plt: 413    Metabolic Panel (12-11 @ 01:00)  138  |  100  |  10  ----------------------------<  160  3.1   |  20  |  0.80  Ca: 8.9/Phos: 3.7/Magnesium: 1.90    Coagulation Studies (12-10 @ 21:47)  PT/INR: 12.9/1.08, aPTT: 24.8    Urinalysis (12-09 @ 09:43)  Physical: Color Dark Yellow, Appearance Turbid, Mucous x, Gross blood Negative  Analytic: SG 1.021, pH 6.5  Cells: RBC 20, WBC 7  UTI markers: Bacteria Many, Leukocyte esterase Small, Nitrites Negative  Chemical: Glucose x, Ketones Trace, Protein 100, Urobilinogen 2.0, Bilirubin Small
Subjective:   Pt seen and examined at bedside. No events overnight. Pain well controlled. Has not yet ambulated or passed flatus. Tolerating regular diet. Pt denies fever, chills, chest pain, SOB, nausea, vomiting, lightheadedness, dizziness.      Objective:    MEDICATIONS  (STANDING):  acetaminophen     Tablet .. 975 milliGRAM(s) Oral every 6 hours  heparin   Injectable 5000 Unit(s) SubCutaneous every 8 hours  lactated ringers. 1000 milliLiter(s) (75 mL/Hr) IV Continuous <Continuous>  polyethylene glycol 3350 17 Gram(s) Oral daily    MEDICATIONS  (PRN):  ibuprofen  Tablet. 600 milliGRAM(s) Oral every 6 hours PRN Mild Pain (1 - 3)  oxyCODONE    IR 2.5 milliGRAM(s) Oral every 6 hours PRN Moderate Pain (4 - 6)  oxyCODONE    IR 5 milliGRAM(s) Oral every 6 hours PRN Severe Pain (7 - 10)  simethicone 80 milliGRAM(s) Chew every 6 hours PRN Gas      T(F): 97.8 (12-12-24 @ 04:40), Max: 98.6 (12-11-24 @ 16:00)  HR: 87 (12-12-24 @ 04:40) (86 - 122)  BP: 121/71 (12-12-24 @ 04:40) (103/61 - 148/74)  RR: 16 (12-12-24 @ 04:40) (16 - 27)  SpO2: 94% (12-12-24 @ 04:40) (94% - 100%)  Wt(kg): --    Physical Exam:  Constitutional: NAD, A+O x3  CV: RRR  Lungs: Clear to auscultation bilaterally  Abdomen: Soft, nondistended, no guarding or rebound tenderness. Normal bowel sounds  Incision: port site incisions clean, dry, intact  : No bleeding on pad  Extremities: No lower extremity edema or calf tenderness bilaterally; venodynes in place    LABS:  12-12    136    |  100    |  18     ----------------------------<  80     3.8     |  25     |  1.20   12-11    138    |  100    |  10     ----------------------------<  160[H]  3.1[L]   |  20[L]  |  0.80   12-10    139    |  100    |  9      ----------------------------<  153[H]  3.4[L]   |  21[L]  |  0.81     Ca    8.6        12 Dec 2024 04:00  Ca    8.9        11 Dec 2024 01:00  Ca    9.4        10 Dec 2024 21:47  Phos  4.4       12-12  Phos  3.7       12-11  Phos  3.9       12-10  Mg     2.20      12-12  Mg     1.90      12-11  Mg     1.70      12-10          PT/INR - ( 10 Dec 2024 21:47 )   PT: 12.9 sec;   INR: 1.08 ratio         PTT - ( 10 Dec 2024 21:47 )  PTT:24.8 sec  Urinalysis Basic - ( 12 Dec 2024 04:00 )    Color: x / Appearance: x / SG: x / pH: x  Gluc: 80 mg/dL / Ketone: x  / Bili: x / Urobili: x   Blood: x / Protein: x / Nitrite: x   Leuk Esterase: x / RBC: x / WBC x   Sq Epi: x / Non Sq Epi: x / Bacteria: x      CAPILLARY BLOOD GLUCOSE          I&O's Summary    10 Dec 2024 07:01  -  11 Dec 2024 07:00  --------------------------------------------------------  IN: 1452.4 mL / OUT: 890 mL / NET: 562.4 mL    11 Dec 2024 07:01  -  12 Dec 2024 06:58  --------------------------------------------------------  IN: 3320.1 mL / OUT: 830 mL / NET: 2490.1 mL          
Subjective:   Pt seen and examined at bedside. No events overnight. Pain well controlled. Patient has been passing flatus and ambulating. Tolerating regular diet. Pt denies fever, chills, chest pain, SOB, nausea, vomiting, lightheadedness, dizziness.      Objective:  T(F): 97.9 (12-13-24 @ 04:19), Max: 98.6 (12-12-24 @ 19:51)  HR: 102 (12-13-24 @ 04:19) (75 - 102)  BP: 130/67 (12-13-24 @ 04:19) (122/58 - 150/68)  RR: 18 (12-13-24 @ 04:19) (17 - 19)  SpO2: 94% (12-13-24 @ 04:19) (94% - 98%)  Wt(kg): --  I&O's Summary    11 Dec 2024 07:01  -  12 Dec 2024 07:00  --------------------------------------------------------  IN: 3320.1 mL / OUT: 1230 mL / NET: 2090.1 mL    12 Dec 2024 07:01  -  13 Dec 2024 06:57  --------------------------------------------------------  IN: 1640 mL / OUT: 2700 mL / NET: -1060 mL      CAPILLARY BLOOD GLUCOSE          MEDICATIONS  (STANDING):  acetaminophen     Tablet .. 975 milliGRAM(s) Oral every 6 hours  heparin   Injectable 5000 Unit(s) SubCutaneous every 8 hours  polyethylene glycol 3350 17 Gram(s) Oral daily    MEDICATIONS  (PRN):  oxyCODONE    IR 2.5 milliGRAM(s) Oral every 6 hours PRN Moderate Pain (4 - 6)  oxyCODONE    IR 5 milliGRAM(s) Oral every 6 hours PRN Severe Pain (7 - 10)  simethicone 80 milliGRAM(s) Chew every 6 hours PRN Gas        Physical Exam:  Constitutional: NAD, A+O x3  CV: RRR  Lungs: Clear to auscultation bilaterally  Abdomen: Soft, nondistended, no guarding or rebound tenderness. Normal bowel sounds  Incision: port site incisions clean, dry, intact  : No bleeding on pad  Extremities: No lower extremity edema or calf tenderness bilaterally; venodynes in place    LABS:    12-13    140    |  104    |  18     ----------------------------<  74     3.7     |  26     |  1.08   12-12    136    |  100    |  18     ----------------------------<  80     3.8     |  25     |  1.20     Ca    8.7        13 Dec 2024 04:30  Ca    8.6        12 Dec 2024 04:00  Phos  3.0       12-13  Phos  4.4       12-12  Mg     2.30      12-13  Mg     2.20      12-12            Urinalysis Basic - ( 13 Dec 2024 04:30 )    Color: x / Appearance: x / SG: x / pH: x  Gluc: 74 mg/dL / Ketone: x  / Bili: x / Urobili: x   Blood: x / Protein: x / Nitrite: x   Leuk Esterase: x / RBC: x / WBC x   Sq Epi: x / Non Sq Epi: x / Bacteria: x

## 2024-12-13 NOTE — PROGRESS NOTE ADULT - ASSESSMENT
A/P: 84y POD#1 s/p RA TLH, BSO, Cysto, R inguinal hernia repair for ovarian cyst and right groin mass (frozen of cyst=benign) currently in SICU because of delayed extubation. Patient is hemodynamically stable with appropriate UOP. She is alert this morning with plan for leak test and extubation this morning.    Neuro: IV fentanyl for pain. Plan to transition to po pain medication once extubated  CV: hemodynamically stable, overnight H&H stable: 13.5/39.7->12.6/37.0  Pulm: O2 sat wnl on assist control, ETT (12/11-) with plans for extubation this AM  GI: NPO  : UOP adequate at 875cc overnight, plan to d/c Holl[place] this AM  Heme: HSQ and SCDs while in bed for DVT ppx  ID: afebrile, no signs of infection  Endo: No active issues  Fe: Replete electrolytes as needed, LR@140  MSK: PT consult  Dispo: SICU, will downgrade to floor once extubated    seen w/ GYN Onc team  ANGELA Delaney, PGY4  
A/P: 84y POD#3 s/p RA TLH, BSO, Cysto, R inguinal hernia repair for ovarian cyst and right groin mass (frozen of cyst=benign) s/p SICU admission post op because of delayed extubation 2/2 to age related effects to anesthesia. Patient is hemodynamically stable with appropriate UOP. She is alert this morning, tolerating pain and PO intake, breathing comfortably on RA. Patient passing flatus and ambulating.    Neuro: cw PO pain meds  CV: hemodynamically stable, H&H stable: 13.5/39.7->12.6/37.0->10.4/31.5 denies sxs of anemia  -Hx of AVB s/p Cards consult: avoid av kiya blockers  Pulm: s/p ETT (12/11) breathing comfortably on RA, on   GI: tolerating Reg diet now passing flatus  : UOP adequate at 1800cc overnight, voiding spontaneously  -Cr: 0.8->1.20, holding NSAIDs. f/u BMP  Heme: HSQ and SCDs while in bed for DVT ppx  ID: afebrile, no signs of infection  Endo: No active issues  Fe: Replete electrolytes as needed, SLIV  Dispo: Discharge planning  - PT: BIJU Hurt PGY-2    
Ms. Brennan is an 84 year old woman with a history of hypertension, a prior transient cerebrovascular ischemic episode, pericardial effusion requiring prior drainage, and a uterine mass now seen following robotic-assisted laparoscopic total hysterectomy and bilateral salpingo-oophorectomy with general surgery consulted intra-operatively for repair of an indirect right inguinal hernia with an adherent cyst; robotic-assisted transabdominal preperitoneal mesh repair was performed (Dr. Lemus, 12/10/2024). She is recovering well in the SICU, though she remains intubated due to concern for laryngeal edema from prolonged Trendelenburg.     Recommendations  - Surgical incision dressings clean, dry and intact, pain well-controlled, no palpable groin hernias, hematoma, or tenderness. Appears to be healing well on post-operative day one.   - Remainder of care per gynecology and SICU.     Lopez Melissa, PGY-2  Acute Care and General Surgery ([id number])
Ms. Brennan is an 84 year old woman with a history of hypertension, a prior transient cerebrovascular ischemic episode, pericardial effusion requiring prior drainage, and a uterine mass now seen following robotic-assisted laparoscopic total hysterectomy and bilateral salpingo-oophorectomy with general surgery consulted intra-operatively for repair of an indirect right inguinal hernia with an adherent cyst; robotic-assisted transabdominal preperitoneal mesh repair was performed (Dr. Lemus, 12/10/2024). She is recovering well in the SICU, though she remains intubated due to concern for laryngeal edema from prolonged Trendelenburg.     Recommendations  - Surgical incision dressings clean, dry and intact, pain well-controlled, no palpable groin hernias, hematoma, or tenderness. Continues to heal well.   - Remainder of care per gynecology. Please page with any additional concerns or for changes in clinical status.     Lopez Melissa, PGY-2  Acute Care and General Surgery ([id number])  
A/P: 84y POD#1 s/p RA TLH, BSO, Cysto, R inguinal hernia repair for ovarian cyst and right groin mass (frozen of cyst=benign) s/p  SICU admission post op because of delayed extubation 2/2 to age related effects to anesthesia. Patient is hemodynamically stable with appropriate UOP. She is alert this morning, tolerating pain and PO intake, breathing comfortably on RA.     Neuro: cw PO pain meds  CV: hemodynamically stable, overnight H&H stable: 13.5/39.7->12.6/37.0->10.4/31.5 denies sxs of anemia  -Hx of AVB s/p Cards consult: avoid av kiya blockers  Pulm: s/p ETT (12/11) on 2L NC ovn, breathing comfortably on RA, on   GI: tolerating Reg diet  : UOP adequate at 650cc overnight, plan to d/c [place] this AM  -Cr: 0.8->1.20 will dc NSAIDs this AM  Heme: HSQ and SCDs while in bed for DVT ppx  ID: afebrile, no signs of infection  Endo: No active issues  Fe: Replete electrolytes as needed, LR@75  MSK: PT consult  Dispo: inpt post op care.    Juanjose PGY3  
First degree AVB  asymptomatic  avoid av kiya blockers    HTN  off meds for now  stable       
84 year old female with a past medical history significant for hypertension, TIA, pericardial effusion s/p drain, and uterine mass is now s/p a robot assisted laparoscopic total hysterectomy with bilateral salpingo-oophorectomy (BSO) and cystoscopy with Dr. Troy as well as a right inguinal hernia repair with Dr. Lemus. SICU was consulted due to concerns for laryngeal edema i/s/o prolonged trendelenberg positioning (7 hours). SICU to manage mechanical ventilation and perform delayed extubation when there is no  longer a concern for laryngeal edema.    NEUROLOGIC   - Pain control: fentanyl, IV tylenol  - Sedation: propofol    RESPIRATORY   - Monitor SpO2 goal >92%  - Mechanical ventilation volume AC: 16/450/5/50, wean to extubate    CARDIOVASCULAR   - Monitor hemodynamics   - MAP goal > 65    GASTROINTESTINAL   - Diet: NPO    /RENAL   - IV fluids: LR @ 100cc/hr  - Maintain [place] catheter, strict Is/Os  - Monitor electrolytes, replete PRN    HEMATOLOGIC  - Monitor H/H   - DVT ppx: SQH & SCD's    INFECTIOUS DISEASE  - Monitor fever / WBC    ENDOCRINE  - Monitor glucose     LINES  - A line   - [place]   - PIV     DISPO: SICU  
First degree AVB  asymptomatic  avoid av kiya blockers    HTN  resume outpt meds

## 2024-12-19 LAB — SURGICAL PATHOLOGY STUDY: SIGNIFICANT CHANGE UP

## 2024-12-27 ENCOUNTER — APPOINTMENT (OUTPATIENT)
Dept: GYNECOLOGIC ONCOLOGY | Facility: CLINIC | Age: 84
End: 2024-12-27

## 2025-01-02 ENCOUNTER — NON-APPOINTMENT (OUTPATIENT)
Age: 85
End: 2025-01-02

## 2025-01-03 PROBLEM — N94.89 OTHER SPECIFIED CONDITIONS ASSOCIATED WITH FEMALE GENITAL ORGANS AND MENSTRUAL CYCLE: Chronic | Status: ACTIVE | Noted: 2024-11-29

## 2025-01-03 PROBLEM — M19.90 UNSPECIFIED OSTEOARTHRITIS, UNSPECIFIED SITE: Chronic | Status: ACTIVE | Noted: 2024-12-09

## 2025-01-03 PROBLEM — I10 ESSENTIAL (PRIMARY) HYPERTENSION: Chronic | Status: ACTIVE | Noted: 2024-11-29

## 2025-01-03 PROBLEM — J45.909 UNSPECIFIED ASTHMA, UNCOMPLICATED: Chronic | Status: ACTIVE | Noted: 2024-12-09

## 2025-01-06 RX ORDER — OXYCODONE 5 MG/1
5 TABLET ORAL
Qty: 60 | Refills: 0 | Status: ACTIVE | COMMUNITY
Start: 2025-01-06 | End: 1900-01-01

## 2025-01-16 ENCOUNTER — APPOINTMENT (OUTPATIENT)
Dept: GYNECOLOGIC ONCOLOGY | Facility: CLINIC | Age: 85
End: 2025-01-16
Payer: MEDICARE

## 2025-01-16 VITALS
DIASTOLIC BLOOD PRESSURE: 71 MMHG | RESPIRATION RATE: 16 BRPM | HEIGHT: 64 IN | TEMPERATURE: 97.1 F | WEIGHT: 225 LBS | SYSTOLIC BLOOD PRESSURE: 162 MMHG | BODY MASS INDEX: 38.41 KG/M2 | OXYGEN SATURATION: 95 % | HEART RATE: 69 BPM

## 2025-01-16 DIAGNOSIS — R19.09 OTHER INTRA-ABDOMINAL AND PELVIC SWELLING, MASS AND LUMP: ICD-10-CM

## 2025-01-16 DIAGNOSIS — R19.00 INTRA-ABDOMINAL AND PELVIC SWELLING, MASS AND LUMP, UNSPECIFIED SITE: ICD-10-CM

## 2025-01-16 PROCEDURE — 99024 POSTOP FOLLOW-UP VISIT: CPT

## 2025-02-13 ENCOUNTER — APPOINTMENT (OUTPATIENT)
Dept: GYNECOLOGIC ONCOLOGY | Facility: CLINIC | Age: 85
End: 2025-02-13
Payer: MEDICARE

## 2025-02-13 VITALS
TEMPERATURE: 98.2 F | HEIGHT: 64 IN | RESPIRATION RATE: 16 BRPM | DIASTOLIC BLOOD PRESSURE: 81 MMHG | WEIGHT: 225 LBS | OXYGEN SATURATION: 99 % | HEART RATE: 108 BPM | BODY MASS INDEX: 38.41 KG/M2 | SYSTOLIC BLOOD PRESSURE: 150 MMHG

## 2025-02-13 DIAGNOSIS — R19.00 INTRA-ABDOMINAL AND PELVIC SWELLING, MASS AND LUMP, UNSPECIFIED SITE: ICD-10-CM

## 2025-02-13 PROCEDURE — 99024 POSTOP FOLLOW-UP VISIT: CPT

## (undated) DEVICE — XI ARM NEEDLE DRIVER SUTURECUT MEGA 8MM

## (undated) DEVICE — ENDOCATCH 5MM INZII

## (undated) DEVICE — INSUFFLATION NDL COVIDIEN SURGINEEDLE VERESS 120MM

## (undated) DEVICE — FOLEY TRAY 16FR 5CC LF UMETER CLOSED

## (undated) DEVICE — UTERINE MANIPULATOR CONMED VCARE LG 37MM

## (undated) DEVICE — TROCAR COVIDIEN BLUNT TIP HASSAN 10MM STANDARD

## (undated) DEVICE — SUT VICRYL 0 27" UR-6

## (undated) DEVICE — XI CORD BIPOLAR CAUTERY (BLUE)

## (undated) DEVICE — SUT MONOCRYL 4-0 27" PS-2 UNDYED

## (undated) DEVICE — SUT VLOC 180 0 9" GS-21 GREEN

## (undated) DEVICE — PACK D&C

## (undated) DEVICE — TUBING STRYKEFLOW II SUCTION / IRRIGATOR

## (undated) DEVICE — TROCAR APPLIED MEDICAL KII FIOS FIRST ENTRY 5MM X 100MM ADVANCED FIXATION

## (undated) DEVICE — XI DRAPE COLUMN

## (undated) DEVICE — UTERINE MANIPULATOR CONMED VCARE MED 34MM

## (undated) DEVICE — XI CORD MONOPOLAR CAUTERY (GREEN)

## (undated) DEVICE — DRSG STERISTRIPS 0.5 X 4"

## (undated) DEVICE — DRAPE SOL WARMING 44X44IN

## (undated) DEVICE — GLV 6.5 PROTEXIS (WHITE)

## (undated) DEVICE — ELCTR BOVIE PENCIL SMOKE EVACUATION

## (undated) DEVICE — XI ARM FORCEP FENESTRATED BIPOLAR 8MM

## (undated) DEVICE — XI TIP COVER

## (undated) DEVICE — POSITIONER STRAP ARMBOARD VELCRO TS-30

## (undated) DEVICE — XI SEAL UNIVERSIAL 5-12MM

## (undated) DEVICE — TROCAR COVIDIEN VERSAONE FIXATION CANNULA 5MM

## (undated) DEVICE — DRSG TEGADERM 2.5 X 3"

## (undated) DEVICE — UTERINE MANIPULATOR CONMED VCARE SM 32MM

## (undated) DEVICE — DRSG TELFA 3 X 8

## (undated) DEVICE — ENDOCATCH 10MM

## (undated) DEVICE — XI OBTURATOR OPTICAL BLADELESS 8MM

## (undated) DEVICE — POSITIONER PINK PAD PIGAZZI SYSTEM W ARM PROTECTOR

## (undated) DEVICE — VENODYNE/SCD SLEEVE CALF MEDIUM

## (undated) DEVICE — XI ARM SCISSOR MONO CURVED

## (undated) DEVICE — XI DRAPE ARM

## (undated) DEVICE — UTERINE MANIPULATOR COOPER SURGICAL 5MM 33CM GREEN

## (undated) DEVICE — ELECTRO LUBE ANTI-STICK SOLUTION FOR CAUTERY TIP

## (undated) DEVICE — TRAP SPECIMEN SPUTUM 40CC

## (undated) DEVICE — WARMING BLANKET FULL ADULT

## (undated) DEVICE — D HELP - CLEARVIEW CLEARIFY SYSTEM

## (undated) DEVICE — PACK ROBOTIC LIJ

## (undated) DEVICE — SYR LUER LOK 10CC

## (undated) DEVICE — DRSG COMBINE 5 X 9"

## (undated) DEVICE — NDL HYPO REGULAR BEVEL 25G X 1.5" (BLUE)

## (undated) DEVICE — TROCAR SURGIQUEST AIRSEAL 5MM X 100MM

## (undated) DEVICE — TUBING AIRSEAL TRI-LUMEN FILTERED

## (undated) DEVICE — APPLICATOR VISTASEAL LAP DUAL 45CM FLEX

## (undated) DEVICE — DRSG DERMABOND MINI

## (undated) DEVICE — PACK PERI GYN

## (undated) DEVICE — ENDOCATCH II 15MM

## (undated) DEVICE — POSITIONER FOAM EGG CRATE ULNAR 2PCS (PINK)

## (undated) DEVICE — LIGASURE BLUNT TIP 5MM X 37CM